# Patient Record
Sex: FEMALE | Race: WHITE
[De-identification: names, ages, dates, MRNs, and addresses within clinical notes are randomized per-mention and may not be internally consistent; named-entity substitution may affect disease eponyms.]

---

## 2020-05-20 ENCOUNTER — HOSPITAL ENCOUNTER (EMERGENCY)
Dept: HOSPITAL 75 - ER | Age: 55
Discharge: HOME | End: 2020-05-20
Payer: COMMERCIAL

## 2020-05-20 VITALS — SYSTOLIC BLOOD PRESSURE: 103 MMHG | DIASTOLIC BLOOD PRESSURE: 74 MMHG

## 2020-05-20 VITALS — DIASTOLIC BLOOD PRESSURE: 62 MMHG | SYSTOLIC BLOOD PRESSURE: 123 MMHG

## 2020-05-20 VITALS — BODY MASS INDEX: 18.64 KG/M2 | HEIGHT: 66.02 IN | WEIGHT: 115.96 LBS

## 2020-05-20 VITALS — SYSTOLIC BLOOD PRESSURE: 105 MMHG | DIASTOLIC BLOOD PRESSURE: 64 MMHG

## 2020-05-20 DIAGNOSIS — Z88.2: ICD-10-CM

## 2020-05-20 DIAGNOSIS — R79.1: ICD-10-CM

## 2020-05-20 DIAGNOSIS — D61.818: Primary | ICD-10-CM

## 2020-05-20 LAB
ALBUMIN SERPL-MCNC: 3.2 GM/DL (ref 3.2–4.5)
ALP SERPL-CCNC: 137 U/L (ref 40–136)
ALT SERPL-CCNC: 20 U/L (ref 0–55)
BASOPHILS # BLD AUTO: 0 10^3/UL (ref 0–0.1)
BASOPHILS NFR BLD AUTO: 1 % (ref 0–10)
BILIRUB SERPL-MCNC: 1 MG/DL (ref 0.1–1)
BUN/CREAT SERPL: 6
CALCIUM SERPL-MCNC: 8 MG/DL (ref 8.5–10.1)
CHLORIDE SERPL-SCNC: 104 MMOL/L (ref 98–107)
CO2 SERPL-SCNC: 22 MMOL/L (ref 21–32)
CREAT SERPL-MCNC: 0.84 MG/DL (ref 0.6–1.3)
EOSINOPHIL # BLD AUTO: 0 10^3/UL (ref 0–0.3)
EOSINOPHIL NFR BLD AUTO: 1 % (ref 0–10)
ERYTHROCYTE [DISTWIDTH] IN BLOOD BY AUTOMATED COUNT: 19.6 % (ref 10–14.5)
GFR SERPLBLD BASED ON 1.73 SQ M-ARVRAT: > 60 ML/MIN
GLUCOSE SERPL-MCNC: 115 MG/DL (ref 70–105)
HCT VFR BLD CALC: 23 % (ref 35–52)
HGB BLD-MCNC: 6.3 G/DL (ref 11.5–16)
INR PPP: 1.5 (ref 0.8–1.4)
LYMPHOCYTES # BLD AUTO: 0.8 X 10^3 (ref 1–4)
LYMPHOCYTES NFR BLD AUTO: 25 % (ref 12–44)
MAGNESIUM SERPL-MCNC: 1.8 MG/DL (ref 1.6–2.4)
MANUAL DIFFERENTIAL PERFORMED BLD QL: NO
MCH RBC QN AUTO: 23 PG (ref 25–34)
MCHC RBC AUTO-ENTMCNC: 28 G/DL (ref 32–36)
MCV RBC AUTO: 81 FL (ref 80–99)
MONOCYTES # BLD AUTO: 0.4 X 10^3 (ref 0–1)
MONOCYTES NFR BLD AUTO: 13 % (ref 0–12)
NEUTROPHILS # BLD AUTO: 2 X 10^3 (ref 1.8–7.8)
NEUTROPHILS NFR BLD AUTO: 61 % (ref 42–75)
PLATELET # BLD: 109 10^3/UL (ref 130–400)
PMV BLD AUTO: 11.2 FL (ref 7.4–10.4)
POTASSIUM SERPL-SCNC: 3.5 MMOL/L (ref 3.6–5)
PROT SERPL-MCNC: 7 GM/DL (ref 6.4–8.2)
PROTHROMBIN TIME: 18.3 SEC (ref 12.2–14.7)
RETICS #: 138 10E9/L (ref 24–90)
RETICS/RBC NFR: 4.91 % (ref 0.5–2.4)
SODIUM SERPL-SCNC: 136 MMOL/L (ref 135–145)
WBC # BLD AUTO: 3.3 10^3/UL (ref 4.3–11)

## 2020-05-20 PROCEDURE — 85045 AUTOMATED RETICULOCYTE COUNT: CPT

## 2020-05-20 PROCEDURE — 86850 RBC ANTIBODY SCREEN: CPT

## 2020-05-20 PROCEDURE — 36415 COLL VENOUS BLD VENIPUNCTURE: CPT

## 2020-05-20 PROCEDURE — 80053 COMPREHEN METABOLIC PANEL: CPT

## 2020-05-20 PROCEDURE — 86901 BLOOD TYPING SEROLOGIC RH(D): CPT

## 2020-05-20 PROCEDURE — 86900 BLOOD TYPING SEROLOGIC ABO: CPT

## 2020-05-20 PROCEDURE — 86920 COMPATIBILITY TEST SPIN: CPT

## 2020-05-20 PROCEDURE — 36430 TRANSFUSION BLD/BLD COMPNT: CPT

## 2020-05-20 PROCEDURE — 85610 PROTHROMBIN TIME: CPT

## 2020-05-20 PROCEDURE — 82728 ASSAY OF FERRITIN: CPT

## 2020-05-20 PROCEDURE — 83540 ASSAY OF IRON: CPT

## 2020-05-20 PROCEDURE — 85025 COMPLETE CBC W/AUTO DIFF WBC: CPT

## 2020-05-20 PROCEDURE — 83735 ASSAY OF MAGNESIUM: CPT

## 2020-05-20 NOTE — ED GENERAL
General


Chief Complaint:  Respiratory Problems


Stated Complaint:  SOA


Source of Information:  Patient


Exam Limitations:  No Limitations





History of Present Illness


Date Seen by Provider:  May 20, 2020


Time Seen by Provider:  10:04


Initial Comments


This 55-year-old woman presents to the emergency room by private vehicle because

of severe anemia.  She was seen by Lyric Pollard at the [facility] clinic in [place].  

Labs were drawn yesterday and resulted today showing a hemoglobin of 5.7.  

Patient reports having fatigue and dyspnea with exertion over the past few 

months.  She recently moved here from [place] and was establishing care with [facility].





Allergies and Home Medications


Allergies


Coded Allergies:  


     Sulfa (Sulfonamide Antibiotics) (Verified  Allergy, Unknown, 5/20/20)





Patient Home Medication List


Home Medication List Reviewed:  Yes





Review of Systems


Review of Systems


Constitutional:  see HPI


EENTM:  no symptoms reported


Respiratory:  see HPI


Cardiovascular:  no symptoms reported


Gastrointestinal:  no symptoms reported


Genitourinary:  no symptoms reported


Musculoskeletal:  no symptoms reported


Skin:  no symptoms reported


Psychiatric/Neurological:  No Symptoms Reported


Hematologic/Lymphatic:  See HPI





Past Medical-Social-Family Hx


Past Med/Social Hx:  Reviewed Nursing Past Med/Soc Hx


Patient Social History


Recent Foreign Travel:  No


Contact w/Someone Who Travel:  No





Past Medical History


Surgeries:  Yes


Appendectomy, Oophorectomy


Respiratory:  Yes


Cardiac:  Yes


Neurological:  Yes


Pregnant:  No


Reproductive Disorders:  No


Genitourinary:  No


Gastrointestinal:  Yes


Liver Disease/Jaundice (vague history of liver problems)


Musculoskeletal:  No


Endocrine:  No


HEENT:  No


Cancer:  No


Psychosocial:  No


Integumentary:  No





Physical Exam


Vital Signs





Vital Signs - First Documented








 5/20/20





 10:02


 


Temp 37.5


 


Pulse 114


 


Resp 17


 


B/P (MAP) 115/72 (86)


 


Pulse Ox 98


 


O2 Delivery Room Air





Capillary Refill :


Height, Weight, BMI


Height: '"


Weight: lbs. oz. kg;  BMI


Method:


General Appearance:  No Apparent Distress, WD/WN, Thin


HEENT:  PERRL/EOMI, Normal ENT Inspection, Pharynx Normal


Neck:  Normal Inspection


Respiratory:  Lungs Clear, Normal Breath Sounds, No Accessory Muscle Use, No 

Respiratory Distress


Cardiovascular:  No Edema, No Murmur, Normal Peripheral Pulses, Tachycardia


Gastrointestinal:  Non Tender, Soft


Extremity:  Normal Inspection, No Pedal Edema


Neurologic/Psychiatric:  Alert, Oriented x3, No Motor/Sensory Deficits, Normal 

Mood/Affect, CNs II-XII Norm as Tested


Skin:  Normal Color, Warm/Dry





Progress/Results/Core Measures


Suspected Sepsis


SIRS


Temperature: 


Pulse:  


Respiratory Rate: 


 


Laboratory Tests


5/20/20 10:15: White Blood Count 3.3L


Blood Pressure  / 


Mean: 


 


Laboratory Tests


5/20/20 10:15: 


Creatinine 0.84, INR Comment 1.5H, Platelet Count 109L, Total Bilirubin 1.0








Results/Orders


Lab Results





Laboratory Tests








Test


 5/20/20


10:15 5/20/20


16:00 Range/Units


 


 


White Blood Count


 3.3 L


 


 4.3-11.0


10^3/uL


 


Red Blood Count


 2.79 L


 


 4.35-5.85


10^6/uL


 


Hemoglobin 6.3 *L  11.5-16.0  G/DL


 


Hematocrit 23 L  35-52  %


 


Mean Corpuscular Volume 81   80-99  FL


 


Mean Corpuscular Hemoglobin 23 L  25-34  PG


 


Mean Corpuscular Hemoglobin


Concent 28 L


 


 32-36  G/DL





 


Red Cell Distribution Width 19.6 H  10.0-14.5  %


 


Platelet Count


 109 L


 


 130-400


10^3/uL


 


Mean Platelet Volume 11.2 H  7.4-10.4  FL


 


Neutrophils (%) (Auto) 61   42-75  %


 


Lymphocytes (%) (Auto) 25   12-44  %


 


Monocytes (%) (Auto) 13 H  0-12  %


 


Eosinophils (%) (Auto) 1   0-10  %


 


Basophils (%) (Auto) 1   0-10  %


 


Neutrophils # (Auto) 2.0   1.8-7.8  X 10^3


 


Lymphocytes # (Auto) 0.8 L  1.0-4.0  X 10^3


 


Monocytes # (Auto) 0.4   0.0-1.0  X 10^3


 


Eosinophils # (Auto)


 0.0 


 


 0.0-0.3


10^3/uL


 


Basophils # (Auto)


 0.0 


 


 0.0-0.1


10^3/uL


 


Absolute Reticulocyte Count 138 H  24-90  10e9/L


 


Percent Reticulocyte Count 4.91 H  0.50-2.40  %


 


Prothrombin Time 18.3 H  12.2-14.7  SEC


 


INR Comment 1.5 H  0.8-1.4  


 


Sodium Level 136   135-145  MMOL/L


 


Potassium Level 3.5 L  3.6-5.0  MMOL/L


 


Chloride Level 104     MMOL/L


 


Carbon Dioxide Level 22   21-32  MMOL/L


 


Anion Gap 10   5-14  MMOL/L


 


Blood Urea Nitrogen 5 L  7-18  MG/DL


 


Creatinine


 0.84 


 


 0.60-1.30


MG/DL


 


Estimat Glomerular Filtration


Rate > 60 


 


  





 


BUN/Creatinine Ratio 6    


 


Glucose Level 115 H    MG/DL


 


Calcium Level 8.0 L  8.5-10.1  MG/DL


 


Corrected Calcium 8.6   8.5-10.1  MG/DL


 


Magnesium Level 1.8   1.6-2.4  MG/DL


 


Iron Level 16 L    ug/dL


 


Total Iron Binding Capacity 453 H  280-380  ug/dL


 


Unsaturated Iron Binding


Capacity 437 


 


   ug/dL





 


Transferrin % Saturation 4 L  15-50  %


 


Total Bilirubin 1.0   0.1-1.0  MG/DL


 


Aspartate Amino Transf


(AST/SGOT) 45 H


 


 5-34  U/L





 


Alanine Aminotransferase


(ALT/SGPT) 20 


 


 0-55  U/L





 


Alkaline Phosphatase 137 H    U/L


 


Total Protein 7.0   6.4-8.2  GM/DL


 


Albumin 3.2   3.2-4.5  GM/DL


 


Lab Scanned Report


 


 Transfusion


Reaction Form  [id number]











My Orders





Orders - MARLI MATIAS MD


Cbc With Automated Diff (5/20/20 10:04)


Comprehensive Metabolic Panel (5/20/20 10:04)


Magnesium (5/20/20 10:04)


Protime With Inr (5/20/20 10:04)


Red Cells Leukocytes Reduced (5/20/20 10:04)


Type And Screen (5/20/20 10:04)


Iron Tibc %Sat & Ferritin (5/20/20 10:48)


Reticulocyte Count (5/20/20 10:15)


Ns Iv 500 Ml (Sodium Chloride 0.9%) (5/20/20 10:56)





Medications Given in ED





Current Medications








 Medications  Dose


 Ordered  Sig/Abisai


 Route  Start Time


 Stop Time Status Last Admin


Dose Admin


 


 Sodium Chloride  500 ml @   [facility] ONCE


 .ROUTE  5/20/20 10:56


 5/20/20 11:05 DC 5/20/20 11:37


500 MLS/HR








Vital Signs/I&O











 5/20/20 5/20/20 5/20/20 5/20/20





 10:02 11:20 11:35 12:01


 


Temp 37.5 36.2 36.9 36.2





    36.9





    36.4


 


Pulse 114 98 96 96


 


Resp 17 16 12 


 


B/P (MAP) 115/72 (86) 123/62 103/74 


 


Pulse Ox 98 100 100 


 


O2 Delivery Room Air Room Air Room Air 


 


    





    





    





 5/20/20   





 12:17   


 


Temp 36.4   


 


Pulse 85   


 


Resp 17   


 


B/P (MAP) 105/64 (84)   


 


Pulse Ox 100   


 


O2 Delivery Room Air   





Capillary Refill :


Progress Note :  


Progress Note


Patient's hemoglobin was below 7.  A unit of blood was transfused.  Iron studies

were obtained.  I contacted Dr. Perez who agrees scoping should be done very 

soon.  He suggested she also take omeprazole 20 mg daily.





Departure


Impression





   Primary Impression:  


   Pancytopenia


   Additional Impressions:  


   Severe anemia


   Elevated INR


Disposition:  01 HOME, SELF-CARE


Condition:  Stable





Departure-Patient Inst.


Decision time for Depature:  11:13


Referrals:  


[facility]/MAGNUS MATHEW MD


Patient Instructions:  Blood Transfusion





Add. Discharge Instructions:  


Follow-up with your primary care provider at [facility] as soon as possible.


Please also schedule follow-up with Dr. Perez to arrange for endoscopy.  This is 

important to determine where he might be losing blood.


You may continue iron supplementation and/or eating a high iron diet.


Take omeprazole 20 mg daily until you have endoscopy.


Return to the emergency room if you have worsening symptoms.





All discharge instructions reviewed with patient and/or family. Voiced 

understanding.





Copy


Copies To 1:   ANNE LOPEZ DO


Copies To 2:   MAGNUS PEREZ MD, JOSHUA T MD        May 20, 2020 11:15

## 2020-06-05 ENCOUNTER — HOSPITAL ENCOUNTER (EMERGENCY)
Dept: HOSPITAL 75 - ER | Age: 55
Discharge: HOME | End: 2020-06-05
Payer: COMMERCIAL

## 2020-06-05 ENCOUNTER — HOSPITAL ENCOUNTER (OUTPATIENT)
Dept: HOSPITAL 75 - LAB | Age: 55
Discharge: HOME | End: 2020-06-05
Attending: FAMILY MEDICINE
Payer: COMMERCIAL

## 2020-06-05 VITALS — SYSTOLIC BLOOD PRESSURE: 106 MMHG | DIASTOLIC BLOOD PRESSURE: 68 MMHG

## 2020-06-05 VITALS — SYSTOLIC BLOOD PRESSURE: 126 MMHG | DIASTOLIC BLOOD PRESSURE: 71 MMHG

## 2020-06-05 VITALS — DIASTOLIC BLOOD PRESSURE: 43 MMHG | SYSTOLIC BLOOD PRESSURE: 111 MMHG

## 2020-06-05 VITALS — SYSTOLIC BLOOD PRESSURE: 106 MMHG | DIASTOLIC BLOOD PRESSURE: 69 MMHG

## 2020-06-05 VITALS — WEIGHT: 110.23 LBS | HEIGHT: 65.75 IN | BODY MASS INDEX: 17.93 KG/M2

## 2020-06-05 VITALS — DIASTOLIC BLOOD PRESSURE: 67 MMHG | SYSTOLIC BLOOD PRESSURE: 96 MMHG

## 2020-06-05 DIAGNOSIS — K74.60: ICD-10-CM

## 2020-06-05 DIAGNOSIS — Z90.49: ICD-10-CM

## 2020-06-05 DIAGNOSIS — Z88.2: ICD-10-CM

## 2020-06-05 DIAGNOSIS — D64.9: Primary | ICD-10-CM

## 2020-06-05 DIAGNOSIS — Z01.89: Primary | ICD-10-CM

## 2020-06-05 LAB
ALBUMIN SERPL-MCNC: 3.3 GM/DL (ref 3.2–4.5)
ALP SERPL-CCNC: 77 U/L (ref 40–136)
ALT SERPL-CCNC: 19 U/L (ref 0–55)
BASOPHILS # BLD AUTO: 0 10^3/UL (ref 0–0.1)
BASOPHILS NFR BLD AUTO: 1 % (ref 0–10)
BILIRUB SERPL-MCNC: 0.9 MG/DL (ref 0.1–1)
BUN/CREAT SERPL: 11
CALCIUM SERPL-MCNC: 8.6 MG/DL (ref 8.5–10.1)
CHLORIDE SERPL-SCNC: 100 MMOL/L (ref 98–107)
CO2 SERPL-SCNC: 22 MMOL/L (ref 21–32)
CREAT SERPL-MCNC: 0.85 MG/DL (ref 0.6–1.3)
EOSINOPHIL # BLD AUTO: 0 10^3/UL (ref 0–0.3)
EOSINOPHIL NFR BLD AUTO: 1 % (ref 0–10)
ERYTHROCYTE [DISTWIDTH] IN BLOOD BY AUTOMATED COUNT: 18.3 % (ref 10–14.5)
GFR SERPLBLD BASED ON 1.73 SQ M-ARVRAT: > 60 ML/MIN
GLUCOSE SERPL-MCNC: 122 MG/DL (ref 70–105)
HCT VFR BLD CALC: 24 % (ref 35–52)
HCT VFR BLD CALC: 31 % (ref 35–52)
HGB BLD-MCNC: 6.9 G/DL (ref 11.5–16)
HGB BLD-MCNC: 9.2 G/DL (ref 11.5–16)
LYMPHOCYTES # BLD AUTO: 0.4 X 10^3 (ref 1–4)
LYMPHOCYTES NFR BLD AUTO: 11 % (ref 12–44)
MANUAL DIFFERENTIAL PERFORMED BLD QL: NO
MCH RBC QN AUTO: 24 PG (ref 25–34)
MCHC RBC AUTO-ENTMCNC: 29 G/DL (ref 32–36)
MCV RBC AUTO: 82 FL (ref 80–99)
MONOCYTES # BLD AUTO: 0.5 X 10^3 (ref 0–1)
MONOCYTES NFR BLD AUTO: 13 % (ref 0–12)
NEUTROPHILS # BLD AUTO: 2.8 X 10^3 (ref 1.8–7.8)
NEUTROPHILS NFR BLD AUTO: 75 % (ref 42–75)
PLATELET # BLD: 113 10^3/UL (ref 130–400)
PMV BLD AUTO: 11.2 FL (ref 7.4–10.4)
POTASSIUM SERPL-SCNC: 3.9 MMOL/L (ref 3.6–5)
PROT SERPL-MCNC: 6.6 GM/DL (ref 6.4–8.2)
SODIUM SERPL-SCNC: 133 MMOL/L (ref 135–145)
WBC # BLD AUTO: 3.7 10^3/UL (ref 4.3–11)

## 2020-06-05 PROCEDURE — 36415 COLL VENOUS BLD VENIPUNCTURE: CPT

## 2020-06-05 PROCEDURE — 86850 RBC ANTIBODY SCREEN: CPT

## 2020-06-05 PROCEDURE — 85025 COMPLETE CBC W/AUTO DIFF WBC: CPT

## 2020-06-05 PROCEDURE — 36430 TRANSFUSION BLD/BLD COMPNT: CPT

## 2020-06-05 PROCEDURE — 85014 HEMATOCRIT: CPT

## 2020-06-05 PROCEDURE — 86900 BLOOD TYPING SEROLOGIC ABO: CPT

## 2020-06-05 PROCEDURE — 86901 BLOOD TYPING SEROLOGIC RH(D): CPT

## 2020-06-05 PROCEDURE — 80053 COMPREHEN METABOLIC PANEL: CPT

## 2020-06-05 PROCEDURE — 82728 ASSAY OF FERRITIN: CPT

## 2020-06-05 PROCEDURE — 93005 ELECTROCARDIOGRAM TRACING: CPT

## 2020-06-05 PROCEDURE — 84484 ASSAY OF TROPONIN QUANT: CPT

## 2020-06-05 PROCEDURE — 83540 ASSAY OF IRON: CPT

## 2020-06-05 PROCEDURE — 85018 HEMOGLOBIN: CPT

## 2020-06-05 PROCEDURE — 86920 COMPATIBILITY TEST SPIN: CPT

## 2020-06-05 NOTE — XMS REPORT
Continuity of Care Document

                             Created on: 2020



Virginia Chang

External Reference #: [id number]

: 1965

Sex: Female



Demographics





                          Address                   [address] [place]  [address]

 

                          Home Phone                [phone] x

 

                          Preferred Language        Unknown

 

                          Marital Status            Unknown

 

                          Amish Affiliation     Unknown

 

                          Race                      Unknown

 

                          Ethnic Group              Unknown





Author





                          Organization              Unknown

 

                          Address                   Unknown

 

                          Phone                     Unavailable



              



Allergies

      



             Active           Description           Code           Type         

  Severity   

                Reaction           Onset           Reported/Identified          

 

Relationship to Patient                 Clinical Status        

 

                Yes             Sulfa (Sulfonamide Antibiotics)           [id number]

0491           

Drug Allergy           Unknown           N/A                             

020   

                                                             



                  



Medications

      



There is no data.                  



Problems

      



             Date Dx Coded           Attending           Type           Code    

       

Diagnosis                               Diagnosed By        

 

                2020           FLORENCIO ORTIZ, MARLI BRUNNER Ot      

        D61.818    

                          OTHER PANCYTOPENIA                    

 

                2020           FLORENCIO ORTIZ, MARLI BRUNNER Ot      

        R06.02     

                          SHORTNESS OF BREATH                    

 

                2020           FLORENCIO ORTIZ, MARLI BRUNNER Ot      

        R79.1      

                          ABNORMAL COAGULATION PROFILE                    

 

                2020           FLORENCOI ORTIZ, MARLI BRUNNER Ot      

        Z88.2      

                          ALLERGY STATUS TO SULFONAMIDES STATUS                 

   



                        



Procedures

      



There is no data.                  



Results

      



                    Test                Result              Range        

 

                                        TSH w/ FREE T4 - 20 10:39         

 

                    TSH                 1.89 mIU/L           NRG        

 

                    T4, FREE            1.1 ng/dL           0.8-1.8        

 

                                        LIPID PANEL - 20 10:39         

 

                    CHOLESTEROL, TOTAL           101 mg/dL           <200       

 

 

                    HDL CHOLESTEROL           35 mg/dL            > OR = 50     

   

 

                    TRIGLYCERIDES           95 mg/dL            <150        

 

                    LDL-CHOLESTEROL           48 mg/dL (calc)           NRG     

   

 

                    CHOL/HDLC RATIO           2.9 (calc)           <5.0        

 

                    NON HDL CHOLESTEROL           66 mg/dL (calc)           <130

        

 

                                        CMP - 20 10:39         

 

                    GLUCOSE             93 mg/dL            65-99        

 

                    UREA NITROGEN (BUN)           5 mg/dL             7-25      

  

 

                    CREATININE           0.82 mg/dL           0.50-1.05        

 

                    eGFR NON-AFR. AMERICAN           81 mL/min/1.73m2           

> OR = 60        

 

                    eGFR            94 mL/min/1.73m2           >

 OR = 60        

 

                    BUN/CREATININE RATIO           6 (calc)            6-22     

   

 

                    SODIUM              137 mmol/L           135-146        

 

                    POTASSIUM           3.7 mmol/L           3.5-5.3        

 

                    CHLORIDE            103 mmol/L                   

 

                    CARBON DIOXIDE           25 mmol/L           20-32        

 

                    CALCIUM             8.2 mg/dL           8.6-10.4        

 

                    PROTEIN, TOTAL           6.4 g/dL            6.1-8.1        

 

                    ALBUMIN             3.1 g/dL            3.6-5.1        

 

                    GLOBULIN            3.3 g/dL (calc)           1.9-3.7       

 

 

                    ALBUMIN/GLOBULIN RATIO           0.9 (calc)           1.0-2.

5        

 

                    BILIRUBIN, TOTAL           1.0 mg/dL           0.2-1.2      

  

 

                    ALKALINE PHOSPHATASE           140 U/L                

     

 

                    AST                 35 U/L              10-35        

 

                    ALT                 17 U/L              6-29        

 

                                        CBC - 20 10:39         

 

                    WHITE BLOOD CELL COUNT           2.6 Thousand/uL           3

.8-10.8        

 

                    RED BLOOD CELL COUNT           2.56 Million/uL           3.8

0-5.10        

 

                    HEMOGLOBIN           5.7 g/dL            11.7-15.5        

 

                    HEMATOCRIT           20.6 %              35.0-45.0        

 

                    MCV                 80.5 fL             80.0-100.0        

 

                    MCH                 22.3 pg             27.0-33.0        

 

                    MCHC                27.7 g/dL           32.0-36.0        

 

                    RDW                 17.4 %              11.0-15.0        

 

                    PLATELET COUNT           70 Thousand/uL           140-400   

     

 

                    MPV                 12.3 fL             7.5-12.5        

 

                    ABSOLUTE NEUTROPHILS           1570 cells/uL           1500-

7800        

 

                    ABSOLUTE LYMPHOCYTES           629 cells/uL           850-39

00        

 

                    ABSOLUTE MONOCYTES           351 cells/uL           200-950 

       

 

                    ABSOLUTE EOSINOPHILS           39 cells/uL            

       

 

                    ABSOLUTE BASOPHILS           10 cells/uL           0-200    

    

 

                    NEUTROPHILS           60.4 %              NRG        

 

                    LYMPHOCYTES           24.2 %              NRG        

 

                    MONOCYTES           13.5 %              NRG        

 

                    EOSINOPHILS           1.5 %               NRG        

 

                    BASOPHILS           0.4 %               NRG        

 

                                        PLATELET ESTIMATION - 20 10:39    

     

 

                    PLATELET ESTIMATION           DECREASED            ADEQUATE 

       

 

                                        CBC MORPHOLOGY - 20 10:39         

 

                    CBC MORPHOLOGY                               NORMAL        

 

                                        Complete blood count (CBC) with automate

d white blood cell (WBC) differential - 

20 10:15         

 

                          Blood leukocytes automated count (number/volume)      

     3.3 10*3/uL          

                                        4.3-11.0        

 

                          Blood erythrocytes automated count (number/volume)    

       2.79 10*6/uL       

                                        4.35-5.85        

 

                    Venous blood hemoglobin measurement (mass/volume)           

6.3 g/dL            

11.5-16.0        

 

                    Blood hematocrit (volume fraction)           23 %           

     35-52        

 

                    Automated erythrocyte mean corpuscular volume           81 [

foz_us]           

80-99        

 

                                        Automated erythrocyte mean corpuscular h

emoglobin (mass per erythrocyte)        

                          23 pg                     25-34        

 

                                        Automated erythrocyte mean corpuscular h

emoglobin concentration measurement 

(mass/volume)             28 g/dL                   32-36        

 

                    Automated erythrocyte distribution width ratio           19.

6 %              10.0-

14.5        

 

                    Automated blood platelet count (count/volume)           109 

10*3/uL           

130-400        

 

                          Automated blood platelet mean volume measurement      

     11.2 [foz_us]        

                                        7.4-10.4        

 

                    Automated blood neutrophils/100 leukocytes           61 %   

             42-75       

 

 

                    Automated blood lymphocytes/100 leukocytes           25 %   

             12-44       

 

 

                    Blood monocytes/100 leukocytes           13 %               

 0-12        

 

                    Automated blood eosinophils/100 leukocytes           1 %    

             0-10        

 

                    Automated blood basophils/100 leukocytes           1 %      

           0-10        

 

                    Blood neutrophils automated count (number/volume)           

2.0 10*3            

1.8-7.8        

 

                    Blood lymphocytes automated count (number/volume)           

0.8 10*3            

1.0-4.0        

 

                    Blood monocytes automated count (number/volume)           0.

4 10*3            

0.0-1.0        

 

                    Automated eosinophil count           0.0 10*3/uL           0

.0-0.3        

 

                    Automated blood basophil count (count/volume)           0.0 

10*3/uL           

0.0-0.1        

 

                                        Comprehensive metabolic panel - 20

 10:15         

 

                          Serum or plasma sodium measurement (moles/volume)     

      136 mmol/L          

                                        135-145        

 

                          Serum or plasma potassium measurement (moles/volume)  

         3.5 mmol/L       

                                        3.6-5.0        

 

                          Serum or plasma chloride measurement (moles/volume)   

        104 mmol/L        

                                                

 

                    Carbon dioxide           22 mmol/L           21-32        

 

                          Serum or plasma anion gap determination (moles/volume)

           10 mmol/L      

                                        5-14        

 

                          Serum or plasma urea nitrogen measurement (mass/volume

)           5 mg/dL       

                                        7-18        

 

                          Serum or plasma creatinine measurement (mass/volume)  

         0.84 mg/dL       

                                        0.60-1.30        

 

                    Serum or plasma urea nitrogen/creatinine mass ratio         

  6                   NRG  

      

 

                                        Serum or plasma creatinine measurement w

ith calculation of estimated glomerular 

filtration rate           >                         NRG        

 

                    Serum or plasma glucose measurement (mass/volume)           

115 mg/dL           

        

 

                    Serum or plasma calcium measurement (mass/volume)           

8.0 mg/dL           

8.5-10.1        

 

                          Serum or plasma total bilirubin measurement (mass/volu

me)           1.0 mg/dL   

                                        0.1-1.0        

 

                                        Serum or plasma alkaline phosphatase raman

surement (enzymatic activity/volume)    

                          137 U/L                           

 

                                        Serum or plasma aspartate aminotransfera

se measurement (enzymatic 

activity/volume)           45 U/L                    5-34        

 

                                        Serum or plasma alanine aminotransferase

 measurement (enzymatic activity/volume)

                          20 U/L                    0-55        

 

                    Serum or plasma protein measurement (mass/volume)           

7.0 g/dL            

6.4-8.2        

 

                    Serum or plasma albumin measurement (mass/volume)           

3.2 g/dL            

3.2-4.5        

 

                    CALCIUM CORRECTED           8.6 mg/dL           8.5-10.1    

    

 

                                        PT panel in platelet poor plasma by coag

ulation assay - 20 10:15         

 

                          Prothrombin time (PT) in platelet poor plasma by coagu

lation assay           

18.3 s                                  12.2-14.7        

 

                          INR in platelet poor plasma or blood by coagulation as

say           1.5         

                                        0.8-1.4        

 

                                        RED CELLS LEUKO REDUCED AS1 - 20 1

0:15         

 

                          RED CELLS LEUKO REDUCED AS1                          T

RANSFUSED     20 

1053                                    NRG        

 

                                        Blood type T Indirect antibody screen pa

fili - 20 10:15         

 

                    WRISTBAND NUMBER           [id number]             NRG        

 

                    ABO+Rh group           AP                  NRG        

 

                    Blood group antibody screen           NEGATIVE            NR

G        

 

                                        Magnesium - 20 10:15         

 

                    Magnesium           1.8 mg/dL           1.6-2.4        

 

                                        Automated reticulocyte percentage -  10:15         

 

                    Blood reticulocytes count (number/volume)           138 10*9

/L           24-90  

      

 

                    Blood reticulocytes/100 erythrocytes           4.91 %       

       0.50-2.40       

 

 

                                        Serum iron and total iron binding capaci

ty panel - 20 10:15         

 

                    TIBC                453 %               280-380        

 

                    UIBC                437 %                       

 

                    Serum or plasma iron measurement (mass/volume)           16 

%                  

      

 

                          Total iron binding capacity and transferrin saturation

 measurement           4 %

                                        15-50        

 

                    Serum or plasma ferritin measurement (mass/volume)          

 7.0 %               

20.0-177.0        



                                          



Encounters

      



                ACCT No.           Visit Date/Time           Discharge          

 Status         

             Pt. Type           Provider           Facility           Loc./Unit 

          

Complaint        

 

             [id number]           2020 11:00:00                        [facility]    

       

Outpatient           GUILLERMO AGUILERA                               [facility]                                             

 

             [id number]           2020 10:20:00                              

       Document

 Registration                                                                   

 

 

                    [id number]           2020 11:17:00            13:10:00        

                DIS             Emergency           DOVER CARINA JOSHUA           

[facility]           ER                        LOW HEMOGLOBIN        

 

                    [id number]           2020 09:59:00            12:15:00        

                DIS             Outpatient           FLORENCIO ORTIZ, MARLI BRUNNER   

        [facility] 

[facility]           ER                        SOA        

 

             [id number]           2020 12:49:00                        A

CT           

Outpatient                CARINA DOVER DO           [facility]                 SDC                       TYPE   [facility] TRA

NSFUSE 1 UNIT 

PRBCS

## 2020-06-05 NOTE — ED GENERAL
General


Stated Complaint:  LOW HEMOGLOBIN





History of Present Illness


Date Seen by Provider:  Jun 5, 2020


Time Seen by Provider:  11:28


Initial Comments


55-year-old female brought in by EMS. Patient was at her primary care provider 

when she had a syncope event. No syncope event happen all they were drawing 

blood from her. Primary feels it was likely vasovagal. However EMS reports that 

lab at outpatient facility her hemoglobin was 5.9. Patient was seen here couple 

weeks ago and required a blood transfusion with a low hemoglobin. Patient denies

any prior low hemoglobin history. She does have concerns that she's having 

issues with an ulcer. 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 

very young. Patient states that she's had some bloody stool in the past but none

recently. She denies any dark tarry stool.





Allergies and Home Medications


Allergies


Coded Allergies:  


     Sulfa (Sulfonamide Antibiotics) (Verified  Allergy, Unknown, 5/20/20)





Patient Home Medication List


Home Medication List Reviewed:  Yes





Review of Systems


Review of Systems


Constitutional:  No chills, No fever; weakness


Respiratory:  No cough, No short of breath


Cardiovascular:  No chest pain, No palpitations


Gastrointestinal:  see HPI


Musculoskeletal:  no symptoms reported


Skin:  no symptoms reported


Psychiatric/Neurological:  See HPI





Past Medical-Social-Family Hx


Past Med/Social Hx:  Reviewed Nursing Past Med/Soc Hx


Patient Social History


2nd Hand Smoke Exposure:  No


Recent Hopitalizations:  No





Seasonal Allergies


Seasonal Allergies:  Yes





Past Medical History


Surgeries:  Yes


Appendectomy, Oophorectomy


Respiratory:  Yes


Pneumonia


Cardiac:  Yes


Neurological:  Yes


Reproductive Disorders:  No


Genitourinary:  No


Gastrointestinal:  Yes


Liver Disease/Jaundice


Musculoskeletal:  No


Endocrine:  No


HEENT:  No


Cancer:  No


Psychosocial:  No


Integumentary:  No


Blood Disorders:  No





Physical Exam


Vital Signs





Vital Signs - First Documented








 6/5/20





 11:25


 


Temp 36.2


 


Pulse 97


 


Resp 18


 


B/P (MAP) 117/74 (88)


 


Pulse Ox 100





Capillary Refill :


Height, Weight, BMI


Height: '"


Weight: lbs. oz. kg; 18.00 BMI


Method:


General Appearance:  No Apparent Distress, WD/WN


HEENT:  PERRL/EOMI


Respiratory:  Lungs Clear, Normal Breath Sounds


Cardiovascular:  Regular Rate, Rhythm, No Edema


Extremity:  Normal Capillary Refill, Normal Inspection


Neurologic/Psychiatric:  Alert, Oriented x3, No Motor/Sensory Deficits


Skin:  Normal Color, Warm/Dry


Lymphatic:  No Adenopathy





Progress/Results/Core Measures


Suspected Sepsis


SIRS


Temperature: 


Pulse:  


Respiratory Rate: 


 


Laboratory Tests


6/5/20 11:32: White Blood Count 3.7L


Blood Pressure  / 


Mean: 


 





Laboratory Tests


6/5/20 11:32: 


Creatinine 0.85, Platelet Count 113L, Total Bilirubin 0.9








Results/Orders


Lab Results





Laboratory Tests








Test


 6/5/20


11:32 Range/Units


 


 


White Blood Count


 3.7 L


 4.3-11.0


10^3/uL


 


Red Blood Count


 2.93 L


 4.35-5.85


10^6/uL


 


Hemoglobin 6.9 *L 11.5-16.0  G/DL


 


Hematocrit 24 L 35-52  %


 


Mean Corpuscular Volume 82  80-99  FL


 


Mean Corpuscular Hemoglobin 24 L 25-34  PG


 


Mean Corpuscular Hemoglobin


Concent 29 L


 32-36  G/DL





 


Red Cell Distribution Width 18.3 H 10.0-14.5  %


 


Platelet Count


 113 L


 130-400


10^3/uL


 


Mean Platelet Volume 11.2 H 7.4-10.4  FL


 


Neutrophils (%) (Auto) 75  42-75  %


 


Lymphocytes (%) (Auto) 11 L 12-44  %


 


Monocytes (%) (Auto) 13 H 0-12  %


 


Eosinophils (%) (Auto) 1  0-10  %


 


Basophils (%) (Auto) 1  0-10  %


 


Neutrophils # (Auto) 2.8  1.8-7.8  X 10^3


 


Lymphocytes # (Auto) 0.4 L 1.0-4.0  X 10^3


 


Monocytes # (Auto) 0.5  0.0-1.0  X 10^3


 


Eosinophils # (Auto)


 0.0 


 0.0-0.3


10^3/uL


 


Basophils # (Auto)


 0.0 


 0.0-0.1


10^3/uL


 


Sodium Level 133 L 135-145  MMOL/L


 


Potassium Level 3.9  3.6-5.0  MMOL/L


 


Chloride Level 100    MMOL/L


 


Carbon Dioxide Level 22  21-32  MMOL/L


 


Anion Gap 11  5-14  MMOL/L


 


Blood Urea Nitrogen 9  7-18  MG/DL


 


Creatinine


 0.85 


 0.60-1.30


MG/DL


 


Estimat Glomerular Filtration


Rate > 60 


  





 


BUN/Creatinine Ratio 11   


 


Glucose Level 122 H   MG/DL


 


Calcium Level 8.6  8.5-10.1  MG/DL


 


Corrected Calcium 9.2  8.5-10.1  MG/DL


 


Total Bilirubin 0.9  0.1-1.0  MG/DL


 


Aspartate Amino Transf


(AST/SGOT) 40 H


 5-34  U/L





 


Alanine Aminotransferase


(ALT/SGPT) 19 


 0-55  U/L





 


Alkaline Phosphatase 77    U/L


 


Troponin I < 0.028  <0.028  NG/ML


 


Total Protein 6.6  6.4-8.2  GM/DL


 


Albumin 3.3  3.2-4.5  GM/DL








My Orders





Orders - STANISLAWACRINA L DO


Vital Signs: Special (Order) (6/5/20 11:30)


Ns Iv 500 Ml (Sodium Chloride 0.9%) (6/5/20 11:30)


Type And Screen (6/5/20 11:30)


Cbc With Automated Diff (6/5/20 11:30)


Comprehensive Metabolic Panel (6/5/20 11:30)


Troponin I (6/5/20 11:30)


Pantoprazole Injection (Protonix Injecti (6/5/20 11:30)


Iron Tibc %Sat & Ferritin (6/5/20 11:30)


Ekg Tracing (6/5/20 12:12)





Medications Given in ED





Current Medications








 Medications  Dose


 Ordered  Sig/Abisai


 Route  Start Time


 Stop Time Status Last Admin


Dose Admin


 


 Pantoprazole  80 mg  ONCE  ONCE


 IV  6/5/20 11:30


 6/5/20 11:34 DC 6/5/20 11:47


80 MG








Vital Signs/I&O











 6/5/20





 11:25


 


Temp 36.2


 


Pulse 97


 


Resp 18


 


B/P (MAP) 117/74 (88)


 


Pulse Ox 100





Capillary Refill :


Progress Note :  


   Time:  12:45


Progress Note


Patient with hemoglobin of 6.9. I did discuss with Dr. Alvarez [facility]. We will 

discharge patient and transfuse 1 unit outpatient. Patient did not follow back 

up with [facility] for eventual outpatient workup and likely EGD and colonoscopy. 

Patient is stable and discharged home in stable condition.





Departure


Impression





   Primary Impression:  


   Anemia


   Qualified Codes:  D50.9 - Iron deficiency anemia, unspecified


   Additional Impression:  


   Cirrhosis of liver


   Qualified Codes:  K74.60 - Unspecified cirrhosis of liver


Disposition:  01 HOME, SELF-CARE


Condition:  Stable





Departure-Patient Inst.


Referrals:  


[facility] (PCP/Family)


Primary Care Physician


Patient Instructions:  Diet for Cirrhosis, Anemia Caused by Low Iron





Add. Discharge Instructions:  


Present to outpatient lab for transfusion at discharge





Follow-up with [facility] as soon as possible for further evaluation of 

your anemia











CARINA DOVER DO                Jun 5, 2020 11:28

## 2020-06-09 ENCOUNTER — HOSPITAL ENCOUNTER (OUTPATIENT)
Dept: HOSPITAL 75 - RAD | Age: 55
End: 2020-06-09
Attending: NURSE PRACTITIONER
Payer: COMMERCIAL

## 2020-06-09 DIAGNOSIS — K63.89: ICD-10-CM

## 2020-06-09 DIAGNOSIS — K76.6: ICD-10-CM

## 2020-06-09 DIAGNOSIS — K74.60: Primary | ICD-10-CM

## 2020-06-09 PROCEDURE — 74177 CT ABD & PELVIS W/CONTRAST: CPT

## 2020-06-09 NOTE — DIAGNOSTIC IMAGING REPORT
PROCEDURE: 

CT abdomen and pelvis with contrast.



TECHNIQUE: 

Multiple contiguous axial images were obtained through the

abdomen and pelvis after administration of intravenous contrast.

Auto Exposure Controls were utilized during the CT exam to meet

ALARA standards for radiation dose reduction. 



DATE: 

June 9, 2020.



COMPARISON: 

None. 



INDICATION: 

55-year-old female, cirrhosis. Blood in stool.



FINDINGS: 

The visualized portions of the lung bases are clear. The heart is

not enlarged. There is no pericardial effusion.



The outer liver contours are not grossly nodular. There is a very

prominent abnormally dilated and recanalized paraumbilical vein

with large collateral vessels in the anterior abdominal wall

subcutaneous tissues at the level of the umbilicus and also in

the right anterior abdomen. The spleen is not meeting threshold

criteria for diagnosis of splenomegaly at 13.5 cm in craniocaudal

dimension. There is no identified focal liver lesion.



There is cholelithiasis without evidence of acute cholecystitis.

There is no biliary ductal dilation. The main pancreatic duct is

not abnormally dilated. Unremarkable appearance of the pancreatic

parenchyma. The adrenal glands are unremarkable.



Unremarkable appearance of the renal parenchyma. The urinary

collecting systems are not distended. There is no identified

renal or ureteral stone. The urinary bladder is unremarkable.



There is mild wall thickening involving the length of the sigmoid

colon extending to the level of the rectum. The intestinal tract

is not distended. There is no free intraperitoneal air, drainable

fluid collection, or free pelvic fluid.



There are atherosclerotic calcifications. There is no identified

abnormally enlarged lymph node in the abdomen or pelvis which

meets CT size criteria for adenopathy.



There is no identified acute bony abnormality.



IMPRESSION: 

1. Cirrhosis with findings of portal hypertension as described

above.



2. Long segment abnormal wall thickening of the entire sigmoid

colon to the level of the rectum which may relate to a

nonspecific colitis. Infectious and inflammatory etiologies are

favored.



Dictated by: 



  Dictated on workstation # [id number]

## 2020-09-17 ENCOUNTER — HOSPITAL ENCOUNTER (OUTPATIENT)
Dept: HOSPITAL 75 - SDC | Age: 55
Discharge: HOME | End: 2020-09-17
Attending: INTERNAL MEDICINE
Payer: COMMERCIAL

## 2020-09-17 VITALS — WEIGHT: 108.25 LBS | BODY MASS INDEX: 17.4 KG/M2 | HEIGHT: 65.98 IN

## 2020-09-17 VITALS — SYSTOLIC BLOOD PRESSURE: 112 MMHG | DIASTOLIC BLOOD PRESSURE: 61 MMHG

## 2020-09-17 DIAGNOSIS — E61.1: ICD-10-CM

## 2020-09-17 DIAGNOSIS — K70.30: Primary | ICD-10-CM

## 2020-09-17 PROCEDURE — 96365 THER/PROPH/DIAG IV INF INIT: CPT

## 2020-10-29 ENCOUNTER — HOSPITAL ENCOUNTER (OUTPATIENT)
Dept: HOSPITAL 75 - PREOP | Age: 55
Discharge: HOME | End: 2020-10-29
Attending: SURGERY
Payer: COMMERCIAL

## 2020-10-29 VITALS — WEIGHT: 111.33 LBS | BODY MASS INDEX: 17.89 KG/M2 | HEIGHT: 66.02 IN

## 2020-10-29 DIAGNOSIS — Z20.828: ICD-10-CM

## 2020-10-29 DIAGNOSIS — K62.5: ICD-10-CM

## 2020-10-29 DIAGNOSIS — Z01.812: Primary | ICD-10-CM

## 2020-10-29 DIAGNOSIS — D64.9: ICD-10-CM

## 2020-10-29 PROCEDURE — 87635 SARS-COV-2 COVID-19 AMP PRB: CPT

## 2020-11-02 ENCOUNTER — HOSPITAL ENCOUNTER (OUTPATIENT)
Dept: HOSPITAL 75 - ENDO | Age: 55
Discharge: HOME | End: 2020-11-02
Attending: SURGERY
Payer: COMMERCIAL

## 2020-11-02 VITALS — DIASTOLIC BLOOD PRESSURE: 49 MMHG | SYSTOLIC BLOOD PRESSURE: 86 MMHG

## 2020-11-02 VITALS — DIASTOLIC BLOOD PRESSURE: 77 MMHG | SYSTOLIC BLOOD PRESSURE: 128 MMHG

## 2020-11-02 VITALS — DIASTOLIC BLOOD PRESSURE: 47 MMHG | SYSTOLIC BLOOD PRESSURE: 73 MMHG

## 2020-11-02 VITALS — DIASTOLIC BLOOD PRESSURE: 60 MMHG | SYSTOLIC BLOOD PRESSURE: 110 MMHG

## 2020-11-02 VITALS — WEIGHT: 111.33 LBS | BODY MASS INDEX: 17.89 KG/M2 | HEIGHT: 66.02 IN

## 2020-11-02 VITALS — SYSTOLIC BLOOD PRESSURE: 104 MMHG | DIASTOLIC BLOOD PRESSURE: 57 MMHG

## 2020-11-02 VITALS — SYSTOLIC BLOOD PRESSURE: 76 MMHG | DIASTOLIC BLOOD PRESSURE: 46 MMHG

## 2020-11-02 VITALS — SYSTOLIC BLOOD PRESSURE: 110 MMHG | DIASTOLIC BLOOD PRESSURE: 60 MMHG

## 2020-11-02 VITALS — DIASTOLIC BLOOD PRESSURE: 76 MMHG | SYSTOLIC BLOOD PRESSURE: 104 MMHG

## 2020-11-02 DIAGNOSIS — Z88.2: ICD-10-CM

## 2020-11-02 DIAGNOSIS — K21.00: ICD-10-CM

## 2020-11-02 DIAGNOSIS — D64.9: ICD-10-CM

## 2020-11-02 DIAGNOSIS — Z79.899: ICD-10-CM

## 2020-11-02 DIAGNOSIS — K64.8: ICD-10-CM

## 2020-11-02 DIAGNOSIS — K70.30: ICD-10-CM

## 2020-11-02 DIAGNOSIS — K29.50: Primary | ICD-10-CM

## 2020-11-02 PROCEDURE — 88312 SPECIAL STAINS GROUP 1: CPT

## 2020-11-02 PROCEDURE — 88305 TISSUE EXAM BY PATHOLOGIST: CPT

## 2020-11-02 NOTE — PROGRESS NOTE-POST OPERATIVE
Post-Operative Progess Note


Surgeon (s)/Assistant (s)


Surgeon


JOS JERRY DO


Assistant:  TOD Alicia





Pre-Operative Diagnosis


rectal bleed, anemia, GERD





Post-Operative Diagnosis





Gastritis


Friable tissue


Int hemorrhoids





Procedure & Operative Findings


Date of Procedure


11/2/20


Procedure Performed/Findings


EGD with bx


Colon


Anesthesia Type


IV sedation by CRNA





Estimated Blood Loss


Estimated blood loss (mL):  scant





Specimens/Packing


Specimens Removed


antral bx


body of stomach bx


GE jxn bx











JOS JERRY DO                Nov 2, 2020 12:25

## 2020-11-02 NOTE — ENDOSCOPY DISCHARGE INSTRUCT
Endo Procedure/Findings


Findings


1.:  Gastritis


2.:  Internal Hemorrhoids


3.:  Other Findings (Friable tissue)





Discharge Instructions


-


Activity: You might feel a little sleepy until tomorrow.  This is due to the 

medicine you received to relax you.





Until tomorrow, you should:  


   NOT drive a car, operate machinery or power tools.


   NOT drink any alcoholic beverages.


   NOT make any important decisions or sign importortant papers.





Do not return to work until tomorrow, unless otherwise instructed. Resume 

previous activities tomorrow.





Diet: Start by taking liquids.  If you tolerate liquids, advance to solid food.


1.:  Colonscopy in 10 years, EGD in 3 years





Notify Physician


-


If you experience excessive bleeding, unusual abdominal pain, fever, or chest 

pain, contact your doctor immediately.











JOS JERRY DO                Nov 2, 2020 12:27

## 2020-11-02 NOTE — PROGRESS NOTE-PRE OPERATIVE
Pre-Operative Progress Note


H&P Reviewed


The H&P was reviewed, patient examined and no changes noted.


Time Seen by Provider:  09:46


Date H&P Reviewed:  Nov 2, 2020


Time H&P Reviewed:  09:43


Pre-Operative Diagnosis:  rectal bleed, anemia, GERD











JOS JERRY DO                Nov 2, 2020 09:48

## 2020-11-02 NOTE — ANESTHESIA-GENERAL POST-OP
MAC


Patient Condition


Mental Status/LOC:  Same as Preop


Cardiovascular:  Satisfactory


Nausea/Vomiting:  Absent


Respiratory:  Satisfactory


Pain:  Controlled


Complications:  Absent





Post Op Complications


Complications


None





Follow Up Care/Instructions


Patient Instructions


None needed.





Anesthesiology Discharge Order


Discharge Order


Patient is doing well, no complaints, stable vital signs, no apparent adverse 

anesthesia problems.   


No complications reported per nursing.











CAMILLA MARQUES CRNA          Nov 2, 2020 13:07

## 2020-11-03 NOTE — OPERATIVE REPORT
DATE OF SERVICE:  11/02/2020



PREOPERATIVE DIAGNOSES:

Rectal bleed, anemia and gastroesophageal reflux disease.



POSTOPERATIVE DIAGNOSES:

1.  Gastritis.

2.  Friable mucosal tissue of the colon and some internal hemorrhoids.



PROCEDURE:

1.  EGD with biopsy.

2.  Colonoscopy.



SURGEON:

Arcadio Jerry DO



FIRST ASSISTANT:

Debra Huitron MS3.



ANESTHESIA:

IV sedation by the CRNA.



SPECIMEN:

Biopsy from antrum, biopsy from the GE junction, biopsy of body of stomach.



BLOOD LOSS:

Scant.



FLUIDS:

Per anesthesia.



POSTOPERATIVE CONDITION:

Stable.



INDICATION FOR PROCEDURE:

The patient is a 55-year-old female who has been having some rectal bleeding. 

She is anemic and she has some GERD and needed a workup.



FINDINGS:

The patient had some pretty severe gastritis and in the colon had very friable

tissue, but I did not see any colitis or any reasons for her rectal bleeding or

anemia.  She also was noted to have some internal hemorrhoids.



PROCEDURE NOTE:

After informed consent was obtained, the patient was brought to the endoscopy

suite, placed in bed in left lateral decubitus position.  She was administered

IV sedation by the CRNA who then monitored her vitals the entire time, heart

rate, blood pressure and pulse ox.  We started with the EGD, placed the scope

down the mouth through the esophagus into the stomach.  Upon entering the

stomach, noted some gastritis, pushed into the duodenum.  Duodenum looked fine. 

Pulled back and did a biopsy of the antrum.  Retroflexed the scope, did not

really have a hiatal hernia, did have some moderate to severe gastritis in the

body of stomach, did a biopsy of body of stomach and then pulled the scope into

the GE junction, did a biopsy of the GE junction.  The esophagus looked very

red, but looked like it cleared and we were able to flush it with saline.  At

this point, suctioned all the air out of stomach and then pulled the scope up

the esophagus and out the mouth.  Switched camera, switched gloves, went down

below, started the colonoscopy, pushed in to about 150 cm, able to get to the

cecum, took a picture of appendiceal orifice, noted the ileocecal valve and then

slowly withdrew the scope insufflating to look circumferentially at the walls

looking at the cecum, up the ascending colon to the hepatic flexure, then down

the transverse colon to splenic flexure, into the descending colon down to the

sigmoid and finally into the rectum.  Throughout the colon, there was very

easily friable tissue that bled with just the minimal scope trauma, did not see

any colitis, did not see any other masses or any other reasons for the rectal

bleeding.  Retroflexed the scope in the rectal vault, saw some minimal internal

hemorrhoids, took a picture and then removed the scope.  The patient tolerated

the procedure well.  She recovered in endoscopy suite.





Job ID: [id number]

DocumentID: [id number]

Dictated Date:  11/02/2020 17:46:42

Transcription Date: 11/03/2020 04:18:03

Dictated By: ARCADIO JERRY DO

## 2020-11-26 ENCOUNTER — HOSPITAL ENCOUNTER (INPATIENT)
Dept: HOSPITAL 75 - ER | Age: 55
LOS: 3 days | Discharge: HOME | DRG: 379 | End: 2020-11-29
Attending: INTERNAL MEDICINE | Admitting: INTERNAL MEDICINE
Payer: COMMERCIAL

## 2020-11-26 VITALS — SYSTOLIC BLOOD PRESSURE: 114 MMHG | DIASTOLIC BLOOD PRESSURE: 72 MMHG

## 2020-11-26 VITALS — HEIGHT: 55.91 IN | BODY MASS INDEX: 24.3 KG/M2 | WEIGHT: 108.03 LBS

## 2020-11-26 VITALS — DIASTOLIC BLOOD PRESSURE: 60 MMHG | SYSTOLIC BLOOD PRESSURE: 107 MMHG

## 2020-11-26 DIAGNOSIS — Z90.722: ICD-10-CM

## 2020-11-26 DIAGNOSIS — R82.90: ICD-10-CM

## 2020-11-26 DIAGNOSIS — Z87.01: ICD-10-CM

## 2020-11-26 DIAGNOSIS — Z90.89: ICD-10-CM

## 2020-11-26 DIAGNOSIS — I10: ICD-10-CM

## 2020-11-26 DIAGNOSIS — K42.9: ICD-10-CM

## 2020-11-26 DIAGNOSIS — Z20.828: ICD-10-CM

## 2020-11-26 DIAGNOSIS — K74.60: ICD-10-CM

## 2020-11-26 DIAGNOSIS — K21.9: ICD-10-CM

## 2020-11-26 DIAGNOSIS — D50.0: ICD-10-CM

## 2020-11-26 DIAGNOSIS — K80.20: ICD-10-CM

## 2020-11-26 DIAGNOSIS — K92.1: Primary | ICD-10-CM

## 2020-11-26 DIAGNOSIS — E87.6: ICD-10-CM

## 2020-11-26 LAB
ALBUMIN SERPL-MCNC: 3 GM/DL (ref 3.2–4.5)
ALP SERPL-CCNC: 80 U/L (ref 40–136)
ALT SERPL-CCNC: 30 U/L (ref 0–55)
APTT PPP: YELLOW S
BACTERIA #/AREA URNS HPF: (no result) /HPF
BARBITURATES UR QL: NEGATIVE
BASOPHILS # BLD AUTO: 0 10^3/UL (ref 0–0.1)
BASOPHILS NFR BLD AUTO: 0 % (ref 0–10)
BENZODIAZ UR QL SCN: NEGATIVE
BILIRUB SERPL-MCNC: 1.2 MG/DL (ref 0.1–1)
BILIRUB UR QL STRIP: NEGATIVE
BUN/CREAT SERPL: 62
CALCIUM SERPL-MCNC: 9.8 MG/DL (ref 8.5–10.1)
CHLORIDE SERPL-SCNC: 102 MMOL/L (ref 98–107)
CO2 SERPL-SCNC: 17 MMOL/L (ref 21–32)
COCAINE UR QL: NEGATIVE
CREAT SERPL-MCNC: 0.66 MG/DL (ref 0.6–1.3)
EOSINOPHIL # BLD AUTO: 0 10^3/UL (ref 0–0.3)
EOSINOPHIL NFR BLD AUTO: 0 % (ref 0–10)
EOSINOPHIL NFR BLD MANUAL: 1 %
FIBRINOGEN PPP-MCNC: CLEAR MG/DL
GFR SERPLBLD BASED ON 1.73 SQ M-ARVRAT: > 60 ML/MIN
GLUCOSE SERPL-MCNC: 164 MG/DL (ref 70–105)
GLUCOSE UR STRIP-MCNC: NEGATIVE MG/DL
HCT VFR BLD CALC: 30 % (ref 35–52)
HGB BLD-MCNC: 9.4 G/DL (ref 11.5–16)
KETONES UR QL STRIP: NEGATIVE
LEUKOCYTE ESTERASE UR QL STRIP: (no result)
LYMPHOCYTES # BLD AUTO: 1.4 10^3/UL (ref 1–4)
LYMPHOCYTES NFR BLD AUTO: 7 % (ref 12–44)
MANUAL DIFFERENTIAL PERFORMED BLD QL: YES
MCH RBC QN AUTO: 32 PG (ref 25–34)
MCHC RBC AUTO-ENTMCNC: 31 G/DL (ref 32–36)
MCV RBC AUTO: 102 FL (ref 80–99)
METHADONE UR QL SCN: NEGATIVE
METHAMPHETAMINE SCREEN URINE S: NEGATIVE
MONOCYTES # BLD AUTO: 1.5 10^3/UL (ref 0–1)
MONOCYTES NFR BLD AUTO: 8 % (ref 0–12)
MONOCYTES NFR BLD: 3 %
NEUTROPHILS # BLD AUTO: 16.5 10^3/UL (ref 1.8–7.8)
NEUTROPHILS NFR BLD AUTO: 84 % (ref 42–75)
NEUTS BAND NFR BLD MANUAL: 92 %
NITRITE UR QL STRIP: NEGATIVE
OPIATES UR QL SCN: NEGATIVE
OXYCODONE UR QL: NEGATIVE
PH UR STRIP: 7 [PH] (ref 5–9)
PLATELET # BLD: 111 10^3/UL (ref 130–400)
PMV BLD AUTO: 12.4 FL (ref 9–12.2)
POTASSIUM SERPL-SCNC: 4.2 MMOL/L (ref 3.6–5)
PROPOXYPH UR QL: NEGATIVE
PROT SERPL-MCNC: 6.1 GM/DL (ref 6.4–8.2)
PROT UR QL STRIP: NEGATIVE
RBC #/AREA URNS HPF: (no result) /HPF
RBC MORPH BLD: NORMAL
SODIUM SERPL-SCNC: 135 MMOL/L (ref 135–145)
SP GR UR STRIP: 1.01 (ref 1.02–1.02)
SQUAMOUS #/AREA URNS HPF: (no result) /HPF
TRICYCLICS UR QL SCN: NEGATIVE
VARIANT LYMPHS NFR BLD MANUAL: 4 %
WBC # BLD AUTO: 19.7 10^3/UL (ref 4.3–11)

## 2020-11-26 PROCEDURE — 85025 COMPLETE CBC W/AUTO DIFF WBC: CPT

## 2020-11-26 PROCEDURE — 87088 URINE BACTERIA CULTURE: CPT

## 2020-11-26 PROCEDURE — 84145 PROCALCITONIN (PCT): CPT

## 2020-11-26 PROCEDURE — 87635 SARS-COV-2 COVID-19 AMP PRB: CPT

## 2020-11-26 PROCEDURE — 74177 CT ABD & PELVIS W/CONTRAST: CPT

## 2020-11-26 PROCEDURE — 93005 ELECTROCARDIOGRAM TRACING: CPT

## 2020-11-26 PROCEDURE — 85007 BL SMEAR W/DIFF WBC COUNT: CPT

## 2020-11-26 PROCEDURE — 86850 RBC ANTIBODY SCREEN: CPT

## 2020-11-26 PROCEDURE — 86901 BLOOD TYPING SEROLOGIC RH(D): CPT

## 2020-11-26 PROCEDURE — 85027 COMPLETE CBC AUTOMATED: CPT

## 2020-11-26 PROCEDURE — 81000 URINALYSIS NONAUTO W/SCOPE: CPT

## 2020-11-26 PROCEDURE — 82274 ASSAY TEST FOR BLOOD FECAL: CPT

## 2020-11-26 PROCEDURE — 84484 ASSAY OF TROPONIN QUANT: CPT

## 2020-11-26 PROCEDURE — 83605 ASSAY OF LACTIC ACID: CPT

## 2020-11-26 PROCEDURE — 76705 ECHO EXAM OF ABDOMEN: CPT

## 2020-11-26 PROCEDURE — 80053 COMPREHEN METABOLIC PANEL: CPT

## 2020-11-26 PROCEDURE — 80306 DRUG TEST PRSMV INSTRMNT: CPT

## 2020-11-26 PROCEDURE — 86141 C-REACTIVE PROTEIN HS: CPT

## 2020-11-26 PROCEDURE — 36415 COLL VENOUS BLD VENIPUNCTURE: CPT

## 2020-11-26 PROCEDURE — 86900 BLOOD TYPING SEROLOGIC ABO: CPT

## 2020-11-26 PROCEDURE — 83690 ASSAY OF LIPASE: CPT

## 2020-11-26 PROCEDURE — 71045 X-RAY EXAM CHEST 1 VIEW: CPT

## 2020-11-26 PROCEDURE — 86920 COMPATIBILITY TEST SPIN: CPT

## 2020-11-26 PROCEDURE — 71260 CT THORAX DX C+: CPT

## 2020-11-26 RX ADMIN — SODIUM CHLORIDE SCH MLS/HR: 900 INJECTION, SOLUTION INTRAVENOUS at 20:54

## 2020-11-26 RX ADMIN — SODIUM CHLORIDE SCH MLS/HR: 900 INJECTION, SOLUTION INTRAVENOUS at 20:37

## 2020-11-26 RX ADMIN — PANTOPRAZOLE SODIUM SCH MG: 40 INJECTION, POWDER, FOR SOLUTION INTRAVENOUS at 20:41

## 2020-11-26 RX ADMIN — IBUPROFEN PRN MG: 600 TABLET ORAL at 22:04

## 2020-11-26 NOTE — ED GENERAL
General


Chief Complaint:  General Problems/Pain


Stated Complaint:  COVID POSITIVE/SOB


Source of Information:  Patient


Exam Limitations:  No Limitations





History of Present Illness


Date Seen by Provider:  2020


Time Seen by Provider:  16:23


Initial Comments


To ER by EMS from home with reports that she is Covid positive and short of tamiko

ath.  She states that she tested positive when she was swabbed at a 

dermatologist appointment on Friday of last week.  She states that she was not 

symptomatic at that time but went in to be seen for her psoriasis and received a

cortisone injection.  She was then on Friday night and was told that she was 

positive.  She became symptomatic with shortness of breath on .  She stat

es that she is allergic to cortisone and that her liver does not process it.  

She states last time she had it about 20 years ago she had to have fluids 

drained off of her lung.  She believes her lungs are filling up with water.


Timing/Duration:  4-5 Days


Severity:  Moderate


Associated Systoms:  Denies Symptoms





Allergies and Home Medications


Allergies


Coded Allergies:  


     Sulfa (Sulfonamide Antibiotics) (Verified  Allergy, Unknown, 20)





Home Medications


Furosemide 40 Mg Tablet, 40 MG PO DAILY, (Reported)


Pantoprazole Sodium 40 Mg Tablet.dr, 40 MG PO DAILY, (Reported)


Spironolactone 100 Mg Tablet, 100 MG PO DAILY, (Reported)


Sucralfate 1 Gm Tablet, 1 GM PO BID, (Reported)





Patient Home Medication List


Home Medication List Reviewed:  Yes





Review of Systems


Review of Systems


Constitutional:  see HPI


EENTM:  see HPI


Respiratory:  see HPI, short of breath


Cardiovascular:  no symptoms reported


Genitourinary:  no symptoms reported


Musculoskeletal:  no symptoms reported


Skin:  no symptoms reported


Psychiatric/Neurological:  No Symptoms Reported


Hematologic/Lymphatic:  No Symptoms Reported


Immunological/Allergic:  no symptoms reported





Past Medical-Social-Family Hx


Patient Social History


2nd Hand Smoke Exposure:  No


Recent Foreign Travel:  No


Contact w/Someone Who Travel:  No


Recent Hopitalizations:  No





Immunizations Up To Date


Date of Influenza Vaccine:  Oct 22, 2020





Seasonal Allergies


Seasonal Allergies:  Yes





Past Medical History


Surgeries:  Yes


Appendectomy, Oophorectomy


Respiratory:  No


Pneumonia


Cardiac:  Yes


Hypertension


Neurological:  No


Reproductive Disorders:  No


Genitourinary:  No


Gastrointestinal:  Yes


Gastroesophageal Reflux


Musculoskeletal:  No


Endocrine:  No


HEENT:  No


Cancer:  No


Psychosocial:  No


Integumentary:  No


Blood Disorders:  Yes (low iron )


Adverse Reaction/Blood Tranf:  No





Physical Exam


Vital Signs





Vital Signs - First Documented








 20





 16:04


 


Temp 36.1


 


Pulse 78


 


Resp 24


 


B/P (MAP) 101/72 (82)


 


Pulse Ox 100


 


O2 Delivery Room Air





Capillary Refill :


Height, Weight, BMI


Height: '"


Weight: lbs. oz. kg; 17.95 BMI


Method:


General Appearance:  No Apparent Distress, WD/WN


Eyes:  Bilateral Eye Normal Inspection, Bilateral Eye PERRL, Bilateral Eye EOMI


Respiratory:  No Accessory Muscle Use, No Respiratory Distress


Cardiovascular:  Regular Rate, Rhythm, Normal Peripheral Pulses


Gastrointestinal:  Non Tender, Soft


Extremity:  Normal Capillary Refill, Normal Inspection


Neurologic/Psychiatric:  Alert, Oriented x3, Other (Her eyes closed when talking

to me, tachypneic with a respiratory rate in the upper 20s.  Oxygen saturation 

100% room air.  Heart rate 101.  Blood pressure 101/72.  Very anxious 

appearing.)


Skin:  Normal Color, Warm/Dry





Focused Exam


Lactate Level


20 16:10: Lactic Acid Level 5.98*H


20 18:30: 





Lactic Acid Level





Laboratory Tests








Test


 20


16:10 20


18:30


 


Lactic Acid Level


 5.98 MMOL/L


(0.50-2.00)  *H 














Progress/Results/Core Measures


Suspected Sepsis


SIRS


Temperature: 


Pulse:  


Respiratory Rate: 


 


Laboratory Tests


20 16:10: White Blood Count 19.7H


Blood Pressure  / 


Mean: 


 





20 16:10: Lactic Acid Level 5.98*H


20 18:30: 


Laboratory Tests


20 16:10: 


Creatinine 0.66, Platelet Count 111L, Total Bilirubin 1.2H








Results/Orders


Lab Results





Laboratory Tests








Test


 20


16:10 20


17:30 20


18:30 Range/Units


 


 


White Blood Count


 19.7 H


 


 


 4.3-11.0


10^3/uL


 


Red Blood Count


 2.97 L


 


 


 3.80-5.11


10^6/uL


 


Hemoglobin 9.4 L   11.5-16.0  g/dL


 


Hematocrit 30 L   35-52  %


 


Mean Corpuscular Volume 102 H   80-99  fL


 


Mean Corpuscular Hemoglobin 32    25-34  pg


 


Mean Corpuscular Hemoglobin


Concent 31 L


 


 


 32-36  g/dL





 


Red Cell Distribution Width 15.3 H   10.0-14.5  %


 


Platelet Count


 111 L


 


 


 130-400


10^3/uL


 


Mean Platelet Volume 12.4 H   9.0-12.2  fL


 


Immature Granulocyte % (Auto) 1     %


 


Neutrophils (%) (Auto) 84 H   42-75  %


 


Lymphocytes (%) (Auto) 7 L   12-44  %


 


Monocytes (%) (Auto) 8    0-12  %


 


Eosinophils (%) (Auto) 0    0-10  %


 


Basophils (%) (Auto) 0    0-10  %


 


Neutrophils # (Auto)


 16.5 H


 


 


 1.8-7.8


10^3/uL


 


Lymphocytes # (Auto)


 1.4 


 


 


 1.0-4.0


10^3/uL


 


Monocytes # (Auto)


 1.5 H


 


 


 0.0-1.0


10^3/uL


 


Eosinophils # (Auto)


 0.0 


 


 


 0.0-0.3


10^3/uL


 


Basophils # (Auto)


 0.0 


 


 


 0.0-0.1


10^3/uL


 


Immature Granulocyte # (Auto)


 0.3 H


 


 


 0.0-0.1


10^3/uL


 


Neutrophils % (Manual) 92     %


 


Lymphocytes % (Manual) 4     %


 


Monocytes % (Manual) 3     %


 


Eosinophils % (Manual) 1     %


 


Blood Morphology Comment NORMAL     


 


Sodium Level 135    135-145  MMOL/L


 


Potassium Level 4.2    3.6-5.0  MMOL/L


 


Chloride Level 102      MMOL/L


 


Carbon Dioxide Level 17 L   21-32  MMOL/L


 


Anion Gap 16 H   5-14  MMOL/L


 


Blood Urea Nitrogen 41 H   7-18  MG/DL


 


Creatinine


 0.66 


 


 


 0.60-1.30


MG/DL


 


Estimat Glomerular Filtration


Rate > 60 


 


 


  





 


BUN/Creatinine Ratio 62     


 


Glucose Level 164 H     MG/DL


 


Lactic Acid Level


 5.98 *H


 


 


 0.50-2.00


MMOL/L


 


Calcium Level 9.8    8.5-10.1  MG/DL


 


Corrected Calcium 10.6 H   8.5-10.1  MG/DL


 


Total Bilirubin 1.2 H   0.1-1.0  MG/DL


 


Aspartate Amino Transf


(AST/SGOT) 45 H


 


 


 5-34  U/L





 


Alanine Aminotransferase


(ALT/SGPT) 30 


 


 


 0-55  U/L





 


Alkaline Phosphatase 80      U/L


 


Troponin I < 0.028    <0.028  NG/ML


 


C-Reactive Protein High


Sensitivity 0.48 


 


 


 0.00-0.50


MG/DL


 


Total Protein 6.1 L   6.4-8.2  GM/DL


 


Albumin 3.0 L   3.2-4.5  GM/DL


 


Procalcitonin 0.14 H   <0.10  NG/ML


 


Urine Color  YELLOW    


 


Urine Clarity  CLEAR    


 


Urine pH  7.0   5-9  


 


Urine Specific Gravity  1.010 L  1.016-1.022  


 


Urine Protein  NEGATIVE   NEGATIVE  


 


Urine Glucose (UA)  NEGATIVE   NEGATIVE  


 


Urine Ketones  NEGATIVE   NEGATIVE  


 


Urine Nitrite  NEGATIVE   NEGATIVE  


 


Urine Bilirubin  NEGATIVE   NEGATIVE  


 


Urine Urobilinogen  0.2   < = 1.0  MG/DL


 


Urine Leukocyte Esterase  1+ H  NEGATIVE  


 


Urine RBC (Auto)  2+ H  NEGATIVE  


 


Urine RBC  NONE    /HPF


 


Urine WBC  10-25 H   /HPF


 


Urine Squamous Epithelial


Cells 


 0-2 


 


  /HPF





 


Urine Crystals  NONE    /LPF


 


Urine Bacteria  TRACE    /HPF


 


Urine Casts  NONE    /LPF


 


Urine Mucus  NEGATIVE    /LPF


 


Urine Culture Indicated  YES    


 


Urine Opiates Screen  NEGATIVE   NEGATIVE  


 


Urine Oxycodone Screen  NEGATIVE   NEGATIVE  


 


Urine Methadone Screen  NEGATIVE   NEGATIVE  


 


Urine Propoxyphene Screen  NEGATIVE   NEGATIVE  


 


Urine Barbiturates Screen  NEGATIVE   NEGATIVE  


 


Ur Tricyclic Antidepressants


Screen 


 NEGATIVE 


 


 NEGATIVE  





 


Urine Phencyclidine Screen  NEGATIVE   NEGATIVE  


 


Urine Amphetamines Screen  NEGATIVE   NEGATIVE  


 


Urine Methamphetamines Screen  NEGATIVE   NEGATIVE  


 


Urine Benzodiazepines Screen  NEGATIVE   NEGATIVE  


 


Urine Cocaine Screen  NEGATIVE   NEGATIVE  


 


Urine Cannabinoids Screen  NEGATIVE   NEGATIVE  








My Orders





Orders - RAMAN MORGAN


Ondansetron Injection (Zofran Injectio (20 16:15)


Lorazepam Injection (Ativan Injection) (20 16:15)


Cbc With Automated Diff (20 16:13)


Hs C Reactive Protein (20 16:13)


Comprehensive Metabolic Panel (20 16:13)


Procalcitonin (Pct) (20 16:13)


Chest 1 View, Ap/Pa Only (20 16:13)


Troponin I (20 16:13)


Ekg Tracing (20 16:13)


Ua Culture If Indicated (20 16:22)


Drug Screen Stat (Urine) (20 16:22)


Lactated Ringers (Lr 1000 Ml Iv Solution (20 16:30)


Manual Differential (20 16:10)


Lactic Acid Analyzer (20 16:57)


Ct Chest/Abdomen/Pelvis W (20 17:12)


Pantoprazole Injection (Protonix Injecti (20 17:15)


Ns Iv 500 Ml (Sodium Chloride 0.9%) (20 17:15)


Iohexol Injection (Omnipaque 350 Mg/Ml 1 (20 17:45)


Received Contrast (Hold Metformin- Contr (20 17:45)


Ns (Ivpb) (Sodium Chloride 0.9% Ivpb Bag (20 17:45)


Urine Culture (20 17:30)


Ceftriaxone  For Iv Use (Rocephin  For I (20 18:15)





Medications Given in ED





Current Medications








 Medications  Dose


 Ordered  Sig/Abisai


 Route  Start Time


 Stop Time Status Last Admin


Dose Admin


 


 Iohexol  100 ml  ONCE  ONCE


 IV  20 17:45


 20 17:46 DC 20 18:12


66 ML


 


 Lorazepam  0.5 mg  ONCE  PRN


 IVP  20 16:15


    20 16:31


0.5 MG


 


 Ondansetron HCl  4 mg  ONCE  ONCE


 IVP  20 16:15


 20 16:16 DC 20 16:31


4 MG


 


 Pantoprazole  40 mg  ONCE  ONCE


 IV  20 17:15


 20 17:16 DC 20 17:24


40 MG


 


 Sodium Chloride  100 ml  ONCE  ONCE


 IV  20 17:45


 20 17:46 DC 20 18:12


80 ML








Vital Signs/I&O











 20





 16:04


 


Temp 36.1


 


Pulse 78


 


Resp 24


 


B/P (MAP) 101/72 (82)


 


Pulse Ox 100


 


O2 Delivery Room Air





Capillary Refill :





Diagnostic Imaging





   Diagonstic Imaging:  Xray


Comments


NAME:   CODI STEVENS


MED REC#:   [id number]


ACCOUNT#:   [id number]


PT STATUS:   REG ER


:   1965


PHYSICIAN:   RAMAN MORGAN


ADMIT DATE:   20/ER


                                  ***Signed***


Date of Exam:20





CHEST 1 VIEW, AP/PA ONLY








PATIENT HISTORY: Cough. 





TECHNIQUE: Single frontal view of the chest.





COMPARISON: None.





FINDINGS: The lung volumes are large. No focal consolidation is


seen. No large pleural effusion or pneumothorax is seen. The


cardiomediastinal silhouette is normal in size and contour. No


acute osseous abnormality is seen.





IMPRESSION: No acute pulmonary abnormality seen.





Dictated by: 





  Dictated on workstation # [id number]








Dict:   20


Trans:   20


[facility] [id number]





Interpreted by:     SALLY SCHREIBER MD


Electronically signed by: SALLY SCHREIBER MD 20


NAME:   CODI STEVENS


MED REC#:   [id number]


ACCOUNT#:   [id number]


PT STATUS:   REG ER


:   1965


PHYSICIAN:   RAMAN MORGAN APRN


ADMIT DATE:   20/ER


                                  ***Signed***


Date of Exam:20





CT CHEST/ABDOMEN/PELVIS W








PROCEDURE: CT chest, abdomen, and pelvis with contrast.





TECHNIQUE: Multiple contiguous axial images were obtained through


the chest, abdomen, and pelvis after the administration of


intravenous contrast. Auto Exposure Controls were utilized during


the CT exam to meet ALARA standards for radiation dose reduction.








DATE: 2020.





COMPARISON: Chest radiograph 2020. CT abdomen pelvis


2020. 





INDICATION: 55-year-old female, fever, weakness. Elevated lactic


acid. Epigastric pain. Covid positive. Black stool. Shortness of


breath.





FINDINGS: There is a 3 mm noncalcified right lower lobe pulmonary


nodule on axial image 33. There is no lung mass. There is no


additional focal airspace consolidation. There is no


pneumothorax. There is no pleural effusion. The central airways


are patent.





There is no identified central or segmental pulmonary embolus.


The heart is not enlarged. There is no pericardial effusion.





There is no identified abnormally enlarged mediastinal, hilar or


axillary lymph node meeting CT size criteria for adenopathy.





The liver is mildly nodular in outer contour compatible with


cirrhosis. There is no identified focal liver lesion. There is a


dilated recanalized paraumbilical vein. There is a dilated


collateral vein protruding in the region of the umbilicus. The


main portal vein is patent. The gallbladder is not distended.


There is cholelithiasis. There is very small amount of


pericholecystic fluid without prominent adjacent inflammatory


stranding. There is no identified intrahepatic or extrahepatic


bile duct dilation. The main pancreatic duct is not abnormally


dilated. There is a very small amount of fluid attenuation and


stranding adjacent to the pancreas. The spleen is normal in size.


The adrenal glands are unremarkable.





Unremarkable appearance of the renal parenchyma. The urinary


collecting systems are not distended. There is no identified


renal or ureteral stone. The urinary bladder is unremarkable.





The intestinal tract is not distended. The appendix is not well


seen. There is no identified secondary findings to specifically


suggest acute appendicitis. There is no free intraperitoneal air.


There is no focal drainable fluid collection.





There are atherosclerotic calcifications. There is no identified


abnormally enlarged lymph node in the abdomen or pelvis


specifically meeting CT size criteria for adenopathy.





There is no identified acute bony abnormality.





IMPRESSION: 


CT Chest, abdomen and pelvis:


1. No identified acute cardiopulmonary abnormality.


2. Cirrhosis with dilated and recanalized paraumbilical vein and


dilated collateral vein protruding in the region of the


umbilicus. Minimal ascites.


3. Very small amount of fluid attenuation adjacent to the


pancreas without disruption of the pancreatic parenchyma


architecture. This could relate to minimal ascites although


correlation with laboratory values is recommended as acute


pancreatitis would be difficult to exclude.


4. Cholelithiasis without evidence of acute cholecystitis. No


biliary ductal dilation.





Dictated by: 





  Dictated on workstation # [id number]








Dict:   20


Trans:   20


[facility] [id number]





Interpreted by:     ELBA WOLF MD


Electronically signed by: ELBA WOLF MD 20





Departure


Impression





   Primary Impression:  


   UTI (urinary tract infection)


   Additional Impression:  


   COVID-19


Disposition:  09 ADMITTED AS INPATIENT


Condition:  Stable





Admissions


Decision to Admit Reason:  Admit from ER (General)


Decision to Admit/Date:  2020


Time/Decision to Admit Time:  18:44





Departure-Patient Inst.


Referrals:  


[facility] [place]


Primary Care Physician











RAMAN MORGAN             2020 16:26

## 2020-11-26 NOTE — DIAGNOSTIC IMAGING REPORT
PATIENT HISTORY: Cough. 



TECHNIQUE: Single frontal view of the chest.



COMPARISON: None.



FINDINGS: The lung volumes are large. No focal consolidation is

seen. No large pleural effusion or pneumothorax is seen. The

cardiomediastinal silhouette is normal in size and contour. No

acute osseous abnormality is seen.



IMPRESSION: No acute pulmonary abnormality seen.



Dictated by: 



  Dictated on workstation # [id number]

## 2020-11-26 NOTE — NUR
CODI STEVENS admitted to room 423-1, with an admitting diagnosis of UTI, SEVERE SEPSIS, 
CIRRHOSIS, COVID, UPPER GI BLEED , on 11/26/20 from ED via , accompanied by STAFF 
.CODI STEVENS introduced to surroundings, call light, bed controls, phone, TV, temperature 
control, lights, meal times, smoking policy, visitor policy, side rail policy, bathrooms and 
showers.  Patient Rights given to patient in the handbook.CODI STEVENS verbalizes 
understanding that [facility] is not responsible for the loss or damage to any personal 
effects or valuables that are kept in the patients posession during their hospitalization.

## 2020-11-26 NOTE — DIAGNOSTIC IMAGING REPORT
PROCEDURE: CT chest, abdomen, and pelvis with contrast.



TECHNIQUE: Multiple contiguous axial images were obtained through

the chest, abdomen, and pelvis after the administration of

intravenous contrast. Auto Exposure Controls were utilized during

the CT exam to meet ALARA standards for radiation dose reduction.





DATE: November 26, 2020.



COMPARISON: Chest radiograph November 26, 2020. CT abdomen pelvis

June 9, 2020. 



INDICATION: 55-year-old female, fever, weakness. Elevated lactic

acid. Epigastric pain. Covid positive. Black stool. Shortness of

breath.



FINDINGS: There is a 3 mm noncalcified right lower lobe pulmonary

nodule on axial image 33. There is no lung mass. There is no

additional focal airspace consolidation. There is no

pneumothorax. There is no pleural effusion. The central airways

are patent.



There is no identified central or segmental pulmonary embolus.

The heart is not enlarged. There is no pericardial effusion.



There is no identified abnormally enlarged mediastinal, hilar or

axillary lymph node meeting CT size criteria for adenopathy.



The liver is mildly nodular in outer contour compatible with

cirrhosis. There is no identified focal liver lesion. There is a

dilated recanalized paraumbilical vein. There is a dilated

collateral vein protruding in the region of the umbilicus. The

main portal vein is patent. The gallbladder is not distended.

There is cholelithiasis. There is very small amount of

pericholecystic fluid without prominent adjacent inflammatory

stranding. There is no identified intrahepatic or extrahepatic

bile duct dilation. The main pancreatic duct is not abnormally

dilated. There is a very small amount of fluid attenuation and

stranding adjacent to the pancreas. The spleen is normal in size.

The adrenal glands are unremarkable.



Unremarkable appearance of the renal parenchyma. The urinary

collecting systems are not distended. There is no identified

renal or ureteral stone. The urinary bladder is unremarkable.



The intestinal tract is not distended. The appendix is not well

seen. There is no identified secondary findings to specifically

suggest acute appendicitis. There is no free intraperitoneal air.

There is no focal drainable fluid collection.



There are atherosclerotic calcifications. There is no identified

abnormally enlarged lymph node in the abdomen or pelvis

specifically meeting CT size criteria for adenopathy.



There is no identified acute bony abnormality.



IMPRESSION: 

CT Chest, abdomen and pelvis:

1. No identified acute cardiopulmonary abnormality.

2. Cirrhosis with dilated and recanalized paraumbilical vein and

dilated collateral vein protruding in the region of the

umbilicus. Minimal ascites.

3. Very small amount of fluid attenuation adjacent to the

pancreas without disruption of the pancreatic parenchyma

architecture. This could relate to minimal ascites although

correlation with laboratory values is recommended as acute

pancreatitis would be difficult to exclude.

4. Cholelithiasis without evidence of acute cholecystitis. No

biliary ductal dilation.



Dictated by: 



  Dictated on workstation # [id number]

## 2020-11-27 VITALS — SYSTOLIC BLOOD PRESSURE: 124 MMHG | DIASTOLIC BLOOD PRESSURE: 80 MMHG

## 2020-11-27 VITALS — SYSTOLIC BLOOD PRESSURE: 99 MMHG | DIASTOLIC BLOOD PRESSURE: 63 MMHG

## 2020-11-27 VITALS — SYSTOLIC BLOOD PRESSURE: 101 MMHG | DIASTOLIC BLOOD PRESSURE: 63 MMHG

## 2020-11-27 VITALS — DIASTOLIC BLOOD PRESSURE: 58 MMHG | SYSTOLIC BLOOD PRESSURE: 93 MMHG

## 2020-11-27 VITALS — DIASTOLIC BLOOD PRESSURE: 73 MMHG | SYSTOLIC BLOOD PRESSURE: 122 MMHG

## 2020-11-27 VITALS — SYSTOLIC BLOOD PRESSURE: 102 MMHG | DIASTOLIC BLOOD PRESSURE: 65 MMHG

## 2020-11-27 VITALS — SYSTOLIC BLOOD PRESSURE: 108 MMHG | DIASTOLIC BLOOD PRESSURE: 64 MMHG

## 2020-11-27 VITALS — DIASTOLIC BLOOD PRESSURE: 66 MMHG | SYSTOLIC BLOOD PRESSURE: 103 MMHG

## 2020-11-27 VITALS — DIASTOLIC BLOOD PRESSURE: 63 MMHG | SYSTOLIC BLOOD PRESSURE: 102 MMHG

## 2020-11-27 LAB
ALBUMIN SERPL-MCNC: 2.4 GM/DL (ref 3.2–4.5)
ALP SERPL-CCNC: 59 U/L (ref 40–136)
ALT SERPL-CCNC: 22 U/L (ref 0–55)
BASOPHILS # BLD AUTO: 0 10^3/UL (ref 0–0.1)
BASOPHILS NFR BLD AUTO: 0 % (ref 0–10)
BILIRUB SERPL-MCNC: 0.7 MG/DL (ref 0.1–1)
BUN/CREAT SERPL: 48
CALCIUM SERPL-MCNC: 7.6 MG/DL (ref 8.5–10.1)
CHLORIDE SERPL-SCNC: 108 MMOL/L (ref 98–107)
CO2 SERPL-SCNC: 20 MMOL/L (ref 21–32)
CREAT SERPL-MCNC: 0.6 MG/DL (ref 0.6–1.3)
EOSINOPHIL # BLD AUTO: 0.2 10^3/UL (ref 0–0.3)
EOSINOPHIL NFR BLD AUTO: 2 % (ref 0–10)
GFR SERPLBLD BASED ON 1.73 SQ M-ARVRAT: > 60 ML/MIN
GLUCOSE SERPL-MCNC: 93 MG/DL (ref 70–105)
HCT VFR BLD CALC: 21 % (ref 35–52)
HGB BLD-MCNC: 6.6 G/DL (ref 11.5–16)
LYMPHOCYTES # BLD AUTO: 0.9 10^3/UL (ref 1–4)
LYMPHOCYTES NFR BLD AUTO: 11 % (ref 12–44)
MANUAL DIFFERENTIAL PERFORMED BLD QL: NO
MCH RBC QN AUTO: 32 PG (ref 25–34)
MCHC RBC AUTO-ENTMCNC: 31 G/DL (ref 32–36)
MCV RBC AUTO: 101 FL (ref 80–99)
MONOCYTES # BLD AUTO: 0.7 10^3/UL (ref 0–1)
MONOCYTES NFR BLD AUTO: 8 % (ref 0–12)
NEUTROPHILS # BLD AUTO: 6.9 10^3/UL (ref 1.8–7.8)
NEUTROPHILS NFR BLD AUTO: 79 % (ref 42–75)
PLATELET # BLD: 70 10^3/UL (ref 130–400)
PMV BLD AUTO: 12.6 FL (ref 9–12.2)
POTASSIUM SERPL-SCNC: 3.6 MMOL/L (ref 3.6–5)
PROT SERPL-MCNC: 4.5 GM/DL (ref 6.4–8.2)
SODIUM SERPL-SCNC: 137 MMOL/L (ref 135–145)
WBC # BLD AUTO: 8.7 10^3/UL (ref 4.3–11)

## 2020-11-27 RX ADMIN — SODIUM CHLORIDE SCH MLS/HR: 900 INJECTION, SOLUTION INTRAVENOUS at 20:57

## 2020-11-27 RX ADMIN — IBUPROFEN PRN MG: 600 TABLET ORAL at 09:50

## 2020-11-27 RX ADMIN — SODIUM CHLORIDE SCH MLS/HR: 900 INJECTION, SOLUTION INTRAVENOUS at 20:59

## 2020-11-27 RX ADMIN — SODIUM CHLORIDE SCH MLS/HR: 900 INJECTION, SOLUTION INTRAVENOUS at 05:12

## 2020-11-27 RX ADMIN — PANTOPRAZOLE SODIUM SCH MG: 40 INJECTION, POWDER, FOR SOLUTION INTRAVENOUS at 17:00

## 2020-11-27 RX ADMIN — SODIUM CHLORIDE SCH MLS/HR: 900 INJECTION, SOLUTION INTRAVENOUS at 15:22

## 2020-11-27 RX ADMIN — SODIUM CHLORIDE SCH MLS/HR: 900 INJECTION, SOLUTION INTRAVENOUS at 04:42

## 2020-11-27 RX ADMIN — PANTOPRAZOLE SODIUM SCH MG: 40 INJECTION, POWDER, FOR SOLUTION INTRAVENOUS at 05:12

## 2020-11-27 NOTE — HISTORY & PHYSICAL-HOSPITALIST
History of Present Illness


HPI/Chief Complaint


CC: Hypoxia with weakness





HPI: This is a 55 yoWF clinic patient of [facility] who presented to the ER with 

hypoxia and weakness. COVID swab taken and pending at time of exam. GIB 

suspected to Dr Ramirez consulted. Repeat labs in morning revealed hgb 6.6 so 

transfused 2 units of blood. Black stools reported per patient. O2 maintained.


Source:  patient


Exam Limitations:  no limitations


Date Seen


20


Time Seen by a Provider:  11:00


Attending Physician


Gerri Solorzano DO


[facility]


Referring Physician





Date of Admission


2020 at 19:10





Home Medications & Allergies


Home Medications


Reviewed patient Home Medication Reconciliation performed by pharmacy medication

reconciliations technician and/or nursing.


Patients Allergies have been reviewed.





Allergies





Allergies


Coded Allergies


  Sulfa (Sulfonamide Antibiotics) (Verified Allergy, Unknown, 20)








Past Medical-Social-Family Hx


Past Med/Social Hx:  Reviewed Nursing Past Med/Soc Hx, Reviewed and Corrections 

made


Patient Social History


Marrital Status:  single


Employed/Student:  unemployed


Alcohol Use:  Denies Use


Recreational Drug Use:  No


Smoking Status:  Current Everyday Smoker


2nd Hand Smoke Exposure:  No


Recent Foreign Travel:  No


Contact w/other who traveled:  No


Recent Hopitalizations:  No


Recent Infectious Disease Expo:  No





Immunizations Up To Date


Tetanus Booster (TDap):  Unknown


Date of Influenza Vaccine:  Oct 22, 2020





Seasonal Allergies


Seasonal Allergies:  Yes





Past Medical History


Surgeries:  Appendectomy, Oophorectomy


Cardiac:  Hypertension


Reproductive:  No


Gastrointestinal:  Gastroesophageal Reflux


History of Blood Disorders:  Yes (low iron )


Adverse Reaction to Blood Soares:  No





Family History





Patient reports no known family medical history.





Review of Systems


Constitutional:  see HPI, malaise, weakness


Respiratory:  dyspnea on exertion





Physical Exam


Physical Exam


Vital Signs





Vital Signs - First Documented








 20





 16:04


 


Temp 36.1


 


Pulse 78


 


Resp 24


 


B/P (MAP) 101/72 (82)


 


Pulse Ox 100


 


O2 Delivery Room Air





Capillary Refill : Less Than 3 Seconds


Height, Weight, BMI


Height: '"


Weight: lbs. oz. kg; 24.30 BMI


Method:


General Appearance:  No Apparent Distress, Chronically ill, Thin


Eyes:  Right Eye Normal Inspection, Right Eye PERRL


HEENT:  PERRL/EOMI, Normal ENT Inspection, Pharynx Normal, Moist Mucous 

Membranes


Neck:  Full Range of Motion, Normal Inspection, Non Tender


Respiratory:  Chest Non Tender, Lungs Clear, Normal Breath Sounds, No Accessory 

Muscle Use, No Respiratory Distress


Cardiovascular:  Regular Rate, Rhythm, No Edema, No Gallop, No JVD, No Murmur, 

Normal Peripheral Pulses


Gastrointestinal:  Normal Bowel Sounds, No Organomegaly, No Pulsatile Mass, Non 

Tender, Soft


Back:  Normal Inspection, No CVA Tenderness, No Vertebral Tenderness


Extremity:  Normal Capillary Refill, Normal Inspection, Normal Range of Motion, 

Non Tender, No Calf Tenderness, No Pedal Edema


Neurologic/Psychiatric:  Alert, Oriented x3, No Motor/Sensory Deficits, Normal 

Mood/Affect


Skin:  Normal Color, Warm/Dry


Lymphatic:  No Adenopathy





Results


Results/Procedures


Labs


Laboratory Tests


20 16:10








20 05:20








Patient resulted labs reviewed.





Assessment/Plan


Admission Diagnosis


Assessment:


GIB


Sepsis


UTI


Cirrhosis


Weakness


Anemia





Plan:


Monitor hgb


Transfuse


PPI


Dr Ramirez consultation


Admission Status:  Inpatient Order (span 2 midnights)


Reason for Inpatient Admission:  


GIB





Diagnosis/Problems


Diagnosis/Problems





(1) Upper GI bleed


(2) UTI (urinary tract infection)


Status:  Acute


(3) Sepsis


(4) Cirrhosis


(5) Anemia


(6) Rectal bleeding





Clinical Quality Measures


DVT/VTE Risk/Contraindication:


Risk Factor Score Per Nursin


RFS Level Per Nursing on Admit:  4+=Very High











GERRI SOLORZANO DO                2020 07:00

## 2020-11-27 NOTE — NUR
SPOKE WITH THE PT (I CALLED HER ROOM PHONE) AND WENT THRU THE EXT MED HISTORY TO COMPLETE 
THE MED REC



PT HAD A HARD TIME NAMING HER MEDICATIONS, I SAID THE NAMES OF MEDS THAT WERE ON THE EXT MED 
HISTORY AND PT WAS ABLE TO VERIFY SHE WAS TAKING THEM BUT COULDNT ALWAYS GIVE ME DIRECTIONS 
ON HOW SHE TAKES THEM. 



FUROSEMIDE 40MG WAS LAST FILLED 09- #30/30DS- I DOCUMENTED THE PAST DUE FILL ON THE 
MED REC



PANTOPRAZOLE 40MG- DIRECTIONS ARE 1 TAB BID HOWEVER PT THINKS SHE TAKES 1 TAB DAILY



OTC MEDS:

NONE

## 2020-11-27 NOTE — CONSULTATION - SURGERY
History of Present Illness


History of Present Illness


Patient Consulted On(sandra/time)


20


 11:34


Date Seen by Provider:  2020


Time Seen by Provider:  11:34


History of Present Illness


Consult requested by Dr. Solorzano for anemia.





Patient is a 55-year-old female admitted for UTI/Covid 19. She states she has 

been ill for about 1 week.  Patient states that she went to the dermatologist 

and received a shot of cortisone which she states that she should not have 

received because she usually feels very ill with it.  Patient also had a 

biopsies performed as well.  Patient states that she was tested for Covid and 

reports that she was positive.  Patient states that soon after that office visit

she began developing shortness of breath which worsened over the last 3 days.  

She states she is also been having multiple diarrhea stools and they were 

extremely black the last couple days. She has also in last week experience 

nausea and vomiting.  Patient states the diarrhea has slightly improved today.  

She is feeling a little bit better, she is starting to receive some packed red 

blood cells.  Her hemoglobin dropped down to 6 range.  She is tolerating clear 

liquids today.  She had an elevated lactic however with fluids it returned to 

normal.  Denies any fever sweats chills or chest pain at this time.  She had a 

CT scan performed which demonstrated cirrhotic appearance of liver.,  

Cholelithiasis without changes of cholecystitis, some changes around the 

pancreatic head and a recanalized umbilical vein.





Allergies and Home Medications


Allergies


Coded Allergies:  


     Sulfa (Sulfonamide Antibiotics) (Verified  Allergy, Unknown, 20)





Home Medications


Ergocalciferol (Vitamin D2) 1,250 Mcg Capsule, 1,250 MCG PO FRI, (Reported)


Famotidine 20 Mg Tablet, 20 MG PO DAILY, (Reported)


Furosemide 40 Mg Tablet, 40 MG PO DAILY, (Reported)


   LAST FILLED 2020 #30/30 DAY SUPPLY 


Hydroxyzine HCl 25 Mg Tablet, 25 MG PO Q8H PRN for ANXIETY, (Reported)


Pantoprazole Sodium 40 Mg Tablet.dr, 40 MG PO DAILY, (Reported)


Paroxetine HCl 10 Mg Tablet, 10 MG PO DAILY, (Reported)


Spironolactone 100 Mg Tablet, 100 MG PO DAILY, (Reported)


Sucralfate 1 Gm Tablet, 1 GM PO QID PRN for ULCERS, (Reported)





Patient Home Medication List


Home Medication List Reviewed:  Yes





Past Medical-Social-Family Hx


Patient Social History


Alcohol Use:  Denies Use


Recreational Drug Use:  No


2nd Hand Smoke Exposure:  No


Recent Foreign Travel:  No


Contact w/Someone Who Travel:  No


Recent Infectious Disease Expo:  No


Recent Hopitalizations:  No





Immunizations Up To Date


Tetanus Booster (TDap):  Unknown


Date of Influenza Vaccine:  Oct 22, 2020





Seasonal Allergies


Seasonal Allergies:  Yes





Surgeries


History of Surgeries:  Yes


Surgeries:  Appendectomy, Oophorectomy





Respiratory


History of Respiratory Disorde:  No


Respiratory Disorders:  Pneumonia





Cardiovascular


History of Cardiac Disorders:  Yes


Cardiac Disorders:  Hypertension





Neurological


History of Neurological Disord:  No





Reproductive System


Hx Reproductive Disorders:  No





Genitourinary


History of Genitourinary Disor:  No





Gastrointestinal


History of Gastrointestinal Di:  Yes


Gastrointestinal Disorders:  Gastroesophageal Reflux, Cirrhosis





Musculoskeletal


History of Musculoskeletal Dis:  No





Endocrine


History of Endocrine Disorders:  No





HEENT


History of HEENT Disorders:  No





Cancer


History of Cancer:  No





Psychosocial


History of Psychiatric Problem:  No





Integumentary


History of Skin or Integumenta:  No





Blood Transfusions


History of Blood Disorders:  Yes (low iron )


Adverse Reaction to a Blood Tr:  No





Family Medical History


Significant Family History:  No Pertinent Family Hx


Family Medial History:  


Patient reports no known family medical history.





Review of Systems-General


Constitutional:  No chills, No diaphoresis


EENTM:  No blurred vision, No double vision


Respiratory:  dyspnea on exertion, short of breath


Cardiovascular:  No chest pain, No palpitations


Gastrointestinal:  diarrhea, melena, nausea, vomiting


Genitourinary:  No decreased output, No discharge


Musculoskeletal:  No back pain, No joint pain


Skin:  No change in color, No change in hair/nails


Psychiatric/Neurological:  Denies Anxiety, Denies Depressed, Denies Emotional 

Problems


All Other Systems Reviewed


Negative Unless Noted:  Yes (Negative excepted noted.)





Physical Exam-General Problems


Physical Exam


Vital Signs





Vital Signs - First Documented








 20





 16:04


 


Temp 36.1


 


Pulse 78


 


Resp 24


 


B/P (MAP) 101/72 (82)


 


Pulse Ox 100


 


O2 Delivery Room Air





Capillary Refill : Less Than 3 Seconds


General Appearance:  WD/WN, no apparent distress


HEENT:  PERRL/EOMI, normal ENT inspection


Neck:  non-tender, full range of motion


Respiratory:  chest non-tender, no respiratory distress, no accessory muscle use


Cardiovascular:  regular rate, rhythm, no JVD


Gastrointestinal:  non tender, soft, hernia (Umbilical)


Rectal:  deferred


Back:  no CVA tenderness, no vertebral tenderness


Extremities:  normal range of motion, normal inspection


Neurologic/Psychiatric:  alert, normal mood/affect (Normal mood flat affect), 

oriented x 3


Skin:  normal color, warm/dry


Lymphatic:  no adenopathy





Data Review


Labs


Laboratory Tests


20 16:10: 


White Blood Count 19.7H, Red Blood Count 2.97L, Hemoglobin 9.4L, Hematocrit 30L,

Mean Corpuscular Volume 102H, Mean Corpuscular Hemoglobin 32, Mean Corpuscular 

Hemoglobin Concent 31L, Red Cell Distribution Width 15.3H, Platelet Count 111L, 

Mean Platelet Volume 12.4H, Immature Granulocyte % (Auto) 1, Neutrophils (%) 

(Auto) 84H, Lymphocytes (%) (Auto) 7L, Monocytes (%) (Auto) 8, Eosinophils (%) 

(Auto) 0, Basophils (%) (Auto) 0, Neutrophils # (Auto) 16.5H, Lymphocytes # 

(Auto) 1.4, Monocytes # (Auto) 1.5H, Eosinophils # (Auto) 0.0, Basophils # 

(Auto) 0.0, Immature Granulocyte # (Auto) 0.3H, Neutrophils % (Manual) 92, 

Lymphocytes % (Manual) 4, Monocytes % (Manual) 3, Eosinophils % (Manual) 1, Bl

ood Morphology Comment NORMAL, Sodium Level 135, Potassium Level 4.2, Chloride 

Level 102, Carbon Dioxide Level 17L, Anion Gap 16H, Blood Urea Nitrogen 41H, 

Creatinine 0.66, Estimat Glomerular Filtration Rate > 60, BUN/Creatinine Ratio 

62, Glucose Level 164H, Lactic Acid Level 5.98*H, Calcium Level 9.8, Corrected 

Calcium 10.6H, Total Bilirubin 1.2H, Aspartate Amino Transf (AST/SGOT) 45H, 

Alanine Aminotransferase (ALT/SGPT) 30, Alkaline Phosphatase 80, Troponin I < 

0.028, C-Reactive Protein High Sensitivity 0.48, Total Protein 6.1L, Albumin 

3.0L, Lipase 49, Procalcitonin 0.14H


20 17:30: 


Urine Color YELLOW, Urine Clarity CLEAR, Urine pH 7.0, Urine Specific Gravity 

1.010L, Urine Protein NEGATIVE, Urine Glucose (UA) NEGATIVE, Urine Ketones 

NEGATIVE, Urine Nitrite NEGATIVE, Urine Bilirubin NEGATIVE, Urine Urobilinogen 

0.2, Urine Leukocyte Esterase 1+H, Urine RBC (Auto) 2+H, Urine RBC NONE, Urine 

WBC 10-25H, Urine Squamous Epithelial Cells 0-2, Urine Crystals NONE, Urine 

Bacteria TRACE, Urine Casts NONE, Urine Mucus NEGATIVE, Urine Culture Indicated 

YES, Urine Opiates Screen NEGATIVE, Urine Oxycodone Screen NEGATIVE, Urine 

Methadone Screen NEGATIVE, Urine Propoxyphene Screen NEGATIVE, Urine Barbiturate

s Screen NEGATIVE, Ur Tricyclic Antidepressants Screen NEGATIVE, Urine 

Phencyclidine Screen NEGATIVE, Urine Amphetamines Screen NEGATIVE, Urine 

Methamphetamines Screen NEGATIVE, Urine Benzodiazepines Screen NEGATIVE, Urine 

Cocaine Screen NEGATIVE, Urine Cannabinoids Screen NEGATIVE


20 18:30: Lactic Acid Level 1.69


20 20:15: Coronavirus 2019 (NALLELY) Negative


20 21:30: 


20 05:20: 


White Blood Count 8.7, Red Blood Count 2.09L, Hemoglobin 6.6#*L, Hematocrit 21L,

Mean Corpuscular Volume 101H, Mean Corpuscular Hemoglobin 32, Mean Corpuscular 

Hemoglobin Concent 31L, Red Cell Distribution Width 15.5H, Platelet Count 70L, 

Mean Platelet Volume 12.6H, Immature Granulocyte % (Auto) 1, Neutrophils (%) 

(Auto) 79H, Lymphocytes (%) (Auto) 11L, Monocytes (%) (Auto) 8, Eosinophils (%) 

(Auto) 2, Basophils (%) (Auto) 0, Neutrophils # (Auto) 6.9, Lymphocytes # (Auto)

0.9L, Monocytes # (Auto) 0.7, Eosinophils # (Auto) 0.2, Basophils # (Auto) 0.0, 

Immature Granulocyte # (Auto) 0.0, Sodium Level 137, Potassium Level 3.6, 

Chloride Level 108H, Carbon Dioxide Level 20L, Anion Gap 9, Blood Urea Nitrogen 

29H, Creatinine 0.60, Estimat Glomerular Filtration Rate > 60, BUN/Creatinine 

Ratio 48, Glucose Level 93, Calcium Level 7.6L, Corrected Calcium 8.9, Total 

Bilirubin 0.7, Aspartate Amino Transf (AST/SGOT) 29, Alanine Aminotransferase 

(ALT/SGPT) 22, Alkaline Phosphatase 59, Total Protein 4.5L, Albumin 2.4L





Assessment/Plan


Anemia


Cholelithiasis


Melena


Cirrhosis


UTI


Nausea/vomiting/diarrhea


Shortness of breath





Patient is a 55-year-old female receiving PRBC currently.  She is having dark 

stools so I suspect upper GI bleed


Protonix


Clear liquids


Follow labs and transfuse PRBC as needed.


Antibiotics for UTI


No surgical intervention 


COVID-19 pending





Clinical Quality Measures


DVT/VTE Risk/Contraindication:


Risk Factor Score Per Nursin


RFS Level Per Nursing on Admit:  4+=Very High











TIFFANIE OVALLE DO              2020 15:40

## 2020-11-27 NOTE — NUR
H&H 6.6, 21

DR. LEWIS NOTIFIED, NEW ORDER RECEIVED: GIVE 2 UNITS PRBC'S AND OCCULT BLOOD STOOLS. 

WILL CONTINUE TO MONITOR.

## 2020-11-27 NOTE — DIAGNOSTIC IMAGING REPORT
EXAM: RIGHT UPPER QUADRANT ULTRASOUND 



DATE: November 27, 2020.



COMPARISON: CT chest abdomen pelvis November 26, 2020. 



INDICATION: 55-year-old female, history of gastrointestinal

bleed. 



PROCEDURE: 



Two-dimensional grayscale and color doppler ultrasound

examination of the right upper quadrant is performed. 



FINDINGS: 



Liver: The latter liver contours are minimally nodular. There is

no demonstrated focal liver lesion. There is a recanalized an

abnormally dilated periumbilical vein. The main portal vein is

patent with normal directional flow and velocity. The visualized

hepatic veins are patent.



Bile ducts and gallbladder: There are shadowing gallstones. The

gallbladder is mildly distended. There is no pericholecystic

fluid. The gallbladder wall measures 0.2 cm.  There is no

intrahepatic or extrahepatic biliary ductal dilation. The common

bile duct measures 0.4 cm. 



Right kidney: Unremarkable right kidney. No hydronephrosis. The

right kidney measures 9.8 cm x 5.5 cm x 4.0 cm. 



Pancreas: Very limited ultrasound assessment of the pancreas is

unremarkable. 



IMPRESSION: 

1. Abnormally dilated recanalized paraumbilical vein suggesting

portal hypertension.

2. The liver contours are minimally nodular suggesting cirrhosis.

3. No demonstrated focal liver lesion.

4. Cholelithiasis without specific findings to suggest acute

cholecystitis.

5. No biliary ductal dilation.

6. Very limited pancreatic parenchymal assessment on ultrasound

is unremarkable. Based on prior CT appearance, correlation with

lipase levels is recommended.



 



Dictated by: 



  Dictated on workstation # [id number]

## 2020-11-28 VITALS — DIASTOLIC BLOOD PRESSURE: 60 MMHG | SYSTOLIC BLOOD PRESSURE: 130 MMHG

## 2020-11-28 VITALS — DIASTOLIC BLOOD PRESSURE: 66 MMHG | SYSTOLIC BLOOD PRESSURE: 117 MMHG

## 2020-11-28 VITALS — DIASTOLIC BLOOD PRESSURE: 72 MMHG | SYSTOLIC BLOOD PRESSURE: 148 MMHG

## 2020-11-28 VITALS — DIASTOLIC BLOOD PRESSURE: 73 MMHG | SYSTOLIC BLOOD PRESSURE: 132 MMHG

## 2020-11-28 VITALS — DIASTOLIC BLOOD PRESSURE: 65 MMHG | SYSTOLIC BLOOD PRESSURE: 121 MMHG

## 2020-11-28 VITALS — DIASTOLIC BLOOD PRESSURE: 62 MMHG | SYSTOLIC BLOOD PRESSURE: 131 MMHG

## 2020-11-28 LAB
ALBUMIN SERPL-MCNC: 2.7 GM/DL (ref 3.2–4.5)
ALP SERPL-CCNC: 64 U/L (ref 40–136)
ALT SERPL-CCNC: 32 U/L (ref 0–55)
BILIRUB SERPL-MCNC: 1 MG/DL (ref 0.1–1)
BUN/CREAT SERPL: 23
CALCIUM SERPL-MCNC: 6.9 MG/DL (ref 8.5–10.1)
CHLORIDE SERPL-SCNC: 108 MMOL/L (ref 98–107)
CO2 SERPL-SCNC: 16 MMOL/L (ref 21–32)
CREAT SERPL-MCNC: 0.53 MG/DL (ref 0.6–1.3)
GFR SERPLBLD BASED ON 1.73 SQ M-ARVRAT: > 60 ML/MIN
GLUCOSE SERPL-MCNC: 93 MG/DL (ref 70–105)
HCT VFR BLD CALC: 29 % (ref 35–52)
HGB BLD-MCNC: 9.5 G/DL (ref 11.5–16)
MCH RBC QN AUTO: 31 PG (ref 25–34)
MCHC RBC AUTO-ENTMCNC: 33 G/DL (ref 32–36)
MCV RBC AUTO: 95 FL (ref 80–99)
PLATELET # BLD: 50 10^3/UL (ref 130–400)
PMV BLD AUTO: 11.6 FL (ref 9–12.2)
POTASSIUM SERPL-SCNC: 2.9 MMOL/L (ref 3.6–5)
PROT SERPL-MCNC: 5 GM/DL (ref 6.4–8.2)
SODIUM SERPL-SCNC: 133 MMOL/L (ref 135–145)
WBC # BLD AUTO: 6.2 10^3/UL (ref 4.3–11)

## 2020-11-28 RX ADMIN — PANTOPRAZOLE SODIUM SCH MG: 40 INJECTION, POWDER, FOR SOLUTION INTRAVENOUS at 16:48

## 2020-11-28 RX ADMIN — IBUPROFEN PRN MG: 600 TABLET ORAL at 13:52

## 2020-11-28 RX ADMIN — PANTOPRAZOLE SODIUM SCH MG: 40 INJECTION, POWDER, FOR SOLUTION INTRAVENOUS at 06:14

## 2020-11-28 RX ADMIN — SODIUM CHLORIDE SCH MLS/HR: 900 INJECTION, SOLUTION INTRAVENOUS at 21:29

## 2020-11-28 RX ADMIN — SODIUM CHLORIDE SCH MLS/HR: 900 INJECTION, SOLUTION INTRAVENOUS at 04:41

## 2020-11-28 RX ADMIN — SODIUM CHLORIDE SCH MLS/HR: 900 INJECTION, SOLUTION INTRAVENOUS at 13:51

## 2020-11-28 RX ADMIN — IBUPROFEN PRN MG: 600 TABLET ORAL at 00:05

## 2020-11-28 NOTE — PROGRESS NOTE - HOSPITALIST
Subjective


HPI/CC On Admission


Date Seen by Provider:  2020


Time Seen by Provider:  11:30


CC: Hypoxia with weakness





HPI: This is a 55 yoWF clinic patient of [facility] who presented to the ER with 

hypoxia and weakness. COVID swab taken and pending at time of exam. GIB 

suspected to Dr Ramirez consulted. Repeat labs in morning revealed hgb 6.6 so 

transfused 2 units of blood. Black stools reported per patient. O2 maintained.


Subjective/Events-last exam


Hgb better at 9.5


Platelet count 50k


IVF maintained


Moving around to the bathroom


Dr Ramirez on board


PPI





Review of Systems


General:  Fatigue, Malaise





Focused Exam


Lactate Level


20 16:10: Lactic Acid Level 5.98*H


20 18:30: Lactic Acid Level 1.69








Objective


Exam


Vital Signs





Vital Signs








  Date Time  Temp Pulse Resp B/P (MAP) Pulse Ox O2 Delivery O2 Flow Rate FiO2


 


20 12:00 36.0 74 18 130/60 (83) 100 Room Air  





Capillary Refill : Less Than 3 Seconds


General Appearance:  No Apparent Distress, WD/WN, Chronically ill, Thin


Respiratory:  Lungs Clear, Normal Breath Sounds


Cardiovascular:  Regular Rate, Rhythm


Neurologic/Psychiatric:  Alert, Oriented x3, No Motor/Sensory Deficits, Normal 

Mood/Affect





Results/Procedures


Lab


Laboratory Tests


20 06:58








Patient resulted labs reviewed.





Assessment/Plan


Assessment and Plan


Assess & Plan/Chief Complaint


Assessment:


GIB


Anemia requiring 2 units of blood


Cirrhosis





Plan:


Transfused


PPI


Dr Ramirez consult





Diagnosis/Problems


Diagnosis/Problems





(1) Upper GI bleed


(2) UTI (urinary tract infection)


Status:  Acute


(3) Sepsis


(4) Cirrhosis


(5) Anemia


(6) Rectal bleeding





Clinical Quality Measures


DVT/VTE Risk/Contraindication:


Risk Factor Score Per Nursin


RFS Level Per Nursing on Admit:  4+=Very High











CHASE LEWIS DO                2020 12:58

## 2020-11-28 NOTE — PROGRESS NOTE - SURGERY
MAHESH BURGOS MED STUDENT 20 0936:


Subjective


Date Seen by a Provider:  2020


Time Seen by a Provider:  09:05


Subjective/Events-last exam


Pt states she is feeling much better today. Has more energy, no N/V, abdominal 

pain, or SOB. Notes she is still having frequent BM's that look like "chunks of 

tar". Has been tolerating clear liquid diet. COVID test came back negative. Had 

2 units transfused yesterday and hgb is 9.5 today from 6.6 yesterday. WBC is 

6.2.





Focused Exam


Lactate Level


20 16:10: Lactic Acid Level 5.98*H


20 18:30: Lactic Acid Level 1.69





Objective


Exam





Vital Signs








  Date Time  Temp Pulse Resp B/P (MAP) Pulse Ox O2 Delivery O2 Flow Rate FiO2


 


20 04:00 36.5 77 18 117/66 (83) 100 Room Air  


 


20 00:00 36.4 87 18 121/65 (83) 99 Room Air  


 


20 20:31 36.8 92 18 124/80 (95) 99 Room Air  


 


20 20:25      Room Air  


 


20 16:16 36.8 95 18 122/73 (89) 98 Room Air  


 


20 15:22 36.3 92 18 102/65 100 Room Air  


 


20 12:19 36.7 102 16 103/66 100 Room Air  


 


20 12:03 36.6 94 16 101/63 99 Room Air  


 


20 12:01 36.6 94 16 101/63 99   


 


20 12:00 36.9 92 16 93/58 (70) 99 Room Air  


 


20 09:38 36.9 92 16 93/58 99 Room Air  














I & O 


 


 20





 07:00


 


Intake Total 3330 ml


 


Output Total 200 ml


 


Balance 3130 ml





Capillary Refill : Less Than 3 Seconds


General Appearance:  No Apparent Distress, Thin


HEENT:  PERRL/EOMI, Normal ENT Inspection


Neck:  Normal Inspection, Supple


Respiratory:  Chest Non Tender, Lungs Clear, Normal Breath Sounds, No Accessory 

Muscle Use, No Respiratory Distress


Cardiovascular:  Regular Rate, Rhythm, No Edema, No Murmur


Gastrointestinal:  non tender, soft; No distended, No guarding, No rebound; 

hernia (Umbilical)


Extremity:  Normal Inspection, No Calf Tenderness, No Pedal Edema


Neurologic/Psychiatric:  Alert, Oriented x3, No Motor/Sensory Deficits, Normal 

Mood/Affect


Skin:  Normal Color, Warm/Dry


Lymphatic:  No Adenopathy





Results


Lab


Laboratory Tests


20 16:00: Stool Occult Blood Immunoassay POSITIVEH


20 06:58: 


White Blood Count 6.2, Red Blood Count 3.03L, Hemoglobin 9.5#L, Hematocrit 29L, 

Mean Corpuscular Volume 95, Mean Corpuscular Hemoglobin 31, Mean Corpuscular 

Hemoglobin Concent 33, Red Cell Distribution Width 18.8H, Platelet Count 50L, 

Mean Platelet Volume 11.6, Sodium Level 133L, Potassium Level 2.9L, Chloride 

Level 108H, Carbon Dioxide Level 16L, Anion Gap 9, Blood Urea Nitrogen 12, 

Creatinine 0.53L, Estimat Glomerular Filtration Rate > 60, BUN/Creatinine Ratio 

23, Glucose Level 93, Calcium Level 6.9L, Corrected Calcium 7.9L, Total Bilirubi

n 1.0, Aspartate Amino Transf (AST/SGOT) 42H, Alanine Aminotransferase 

(ALT/SGPT) 32, Alkaline Phosphatase 64, Total Protein 5.0L, Albumin 2.7L





Assessment/Plan


Anemia


Cholelithiasis


Melena


Cirrhosis


UTI


Nausea/vomiting/diarrhea


Shortness of breath








Pt having dark stools so I suspect upper GI bleed


Protonix


Clear liquids


Follow labs and transfuse PRBC as needed.


Antibiotics for UTI 


COVID-19 negative


No surgical intervention at this time





Clinical Quality Measures


DVT/VTE Risk/Contraindication:


Risk Factor Score Per Nursin


RFS Level Per Nursing on Admit:  4+=Very High





TIFFANIE RAMIREZ DO 20 1225:


Subjective


Subjective/Events-last exam


Still with black stools. Hgb responded appropriately to transfusion. No 

abdominal pain.   Denies n/v fever sweats chills shortness of breath or chest 

pain.


On clear diet.





Objective


Exam


General Appearance:  No Apparent Distress


HEENT:  PERRL/EOMI, Normal ENT Inspection


Neck:  Normal Inspection, Supple


Respiratory:  Chest Non Tender, No Accessory Muscle Use, No Respiratory Distress


Cardiovascular:  Regular Rate, Rhythm, No JVD


Gastrointestinal:  non tender, soft; No distended, No guarding, No rebound; 

hernia (Umbilical)


Extremity:  Normal Inspection, No Calf Tenderness


Neurologic/Psychiatric:  Alert, Oriented x3, No Motor/Sensory Deficits, Normal 

Mood/Affect


Skin:  Normal Color, Warm/Dry


Lymphatic:  No Adenopathy





Assessment/Plan


Anemia


Cholelithiasis


Melena


Cirrhosis


UTI


Nausea/vomiting/diarrhea-improved


Shortness of breath-improved








Pt having dark stools so I suspect upper GI bleed


Protonix


Clear liquids


Follow labs and transfuse PRBC as needed.


Antibiotics for UTI 


COVID-19 negative


No surgical intervention at this time





Supervisory-Addendum Brief


Verification & Attestation


Participated in pt care:  history, MDM, physical


Personally performed:  exam, history, MDM, supervision of care


Care discussed with:  Medical Student


Procedures:  n/a


Results interpretation:  Verified all documentation


Verification and Attestation of Medical Student E/M Service





A medical student performed and documented this service in my presence. I 

reviewed and verified all information documented by the medical student and made

modifications to such information, when appropriate. I personally performed the 

physical exam and medical decision making. 





 Tiffanie Ramirez, 2020,12:25











MAHESH BURGOS MED STUDENT     2020 09:36


TIFFANIE RAMIREZ DO              2020 12:25

## 2020-11-29 VITALS — DIASTOLIC BLOOD PRESSURE: 68 MMHG | SYSTOLIC BLOOD PRESSURE: 128 MMHG

## 2020-11-29 VITALS — SYSTOLIC BLOOD PRESSURE: 123 MMHG | DIASTOLIC BLOOD PRESSURE: 68 MMHG

## 2020-11-29 VITALS — DIASTOLIC BLOOD PRESSURE: 80 MMHG | SYSTOLIC BLOOD PRESSURE: 130 MMHG

## 2020-11-29 VITALS — SYSTOLIC BLOOD PRESSURE: 154 MMHG | DIASTOLIC BLOOD PRESSURE: 72 MMHG

## 2020-11-29 LAB
ALBUMIN SERPL-MCNC: 2.7 GM/DL (ref 3.2–4.5)
ALP SERPL-CCNC: 75 U/L (ref 40–136)
ALT SERPL-CCNC: 35 U/L (ref 0–55)
BILIRUB SERPL-MCNC: 0.8 MG/DL (ref 0.1–1)
BUN/CREAT SERPL: 10
CALCIUM SERPL-MCNC: 6.8 MG/DL (ref 8.5–10.1)
CHLORIDE SERPL-SCNC: 110 MMOL/L (ref 98–107)
CO2 SERPL-SCNC: 16 MMOL/L (ref 21–32)
CREAT SERPL-MCNC: 0.49 MG/DL (ref 0.6–1.3)
GFR SERPLBLD BASED ON 1.73 SQ M-ARVRAT: > 60 ML/MIN
GLUCOSE SERPL-MCNC: 93 MG/DL (ref 70–105)
HCT VFR BLD CALC: 28 % (ref 35–52)
HGB BLD-MCNC: 9.4 G/DL (ref 11.5–16)
MCH RBC QN AUTO: 32 PG (ref 25–34)
MCHC RBC AUTO-ENTMCNC: 34 G/DL (ref 32–36)
MCV RBC AUTO: 94 FL (ref 80–99)
PLATELET # BLD: 50 10^3/UL (ref 130–400)
PMV BLD AUTO: 11.6 FL (ref 9–12.2)
POTASSIUM SERPL-SCNC: 2.6 MMOL/L (ref 3.6–5)
PROT SERPL-MCNC: 4.8 GM/DL (ref 6.4–8.2)
SODIUM SERPL-SCNC: 136 MMOL/L (ref 135–145)
WBC # BLD AUTO: 5.2 10^3/UL (ref 4.3–11)

## 2020-11-29 RX ADMIN — SODIUM CHLORIDE SCH MLS/HR: 900 INJECTION, SOLUTION INTRAVENOUS at 07:15

## 2020-11-29 RX ADMIN — PANTOPRAZOLE SODIUM SCH MG: 40 INJECTION, POWDER, FOR SOLUTION INTRAVENOUS at 06:13

## 2020-11-29 RX ADMIN — SODIUM CHLORIDE SCH MLS/HR: 900 INJECTION, SOLUTION INTRAVENOUS at 06:07

## 2020-11-29 RX ADMIN — SODIUM CHLORIDE SCH MLS/HR: 900 INJECTION, SOLUTION INTRAVENOUS at 01:15

## 2020-11-29 RX ADMIN — SODIUM CHLORIDE SCH MLS/HR: 900 INJECTION, SOLUTION INTRAVENOUS at 13:43

## 2020-11-29 RX ADMIN — SODIUM CHLORIDE SCH MLS/HR: 900 INJECTION, SOLUTION INTRAVENOUS at 13:42

## 2020-11-29 RX ADMIN — IBUPROFEN PRN MG: 600 TABLET ORAL at 09:23

## 2020-11-29 NOTE — DISCHARGE SUMMARY
Discharge Summary


Hospital Course


Was the Problem List Reviewed?:  Yes


Problems/Dx:  


(1) Upper GI bleed


(2) UTI (urinary tract infection)


Status:  Acute


(3) Sepsis


(4) Cirrhosis


(5) Anemia


(6) Rectal bleeding


Hospital Course


Date of Admission: 2020 at 19:10 


Admission Diagnosis :  





Family Physician/Provider: [facility]  





Date of Discharge: 20 


Discharge Diagnosis: GIB, Cirrhosis, abnormal UA








Hospital Course:


Short course after admitted with UTI and melena and anemia. UCx revealed normal 

marie so no abx given at DC. Required 2 units of blood. Dr Ramirez consulted and 

will need EGD as outpatient and patient will be on PPI at time of DC BID and 

replacing potassium and restart all her outpatient meds














Labs and Pending Lab Test:


Laboratory Tests


20 07:08: 


White Blood Count 5.2, Red Blood Count 2.94L, Hemoglobin 9.4L, Hematocrit 28L, 

Mean Corpuscular Volume 94, Mean Corpuscular Hemoglobin 32, Mean Corpuscular 

Hemoglobin Concent 34, Red Cell Distribution Width 18.5H, Platelet Count 50L, 

Mean Platelet Volume 11.6, Sodium Level 136, Potassium Level 2.6L, Chloride 

Level 110H, Carbon Dioxide Level 16L, Anion Gap 10, Blood Urea Nitrogen 5L, 

Creatinine 0.49L, Estimat Glomerular Filtration Rate > 60, BUN/Creatinine Ratio 

10, Glucose Level 93, Calcium Level 6.8L, Corrected Calcium 7.8L, Total 

Bilirubin 0.8, Aspartate Amino Transf (AST/SGOT) 44H, Alanine Aminotransferase 

(ALT/SGPT) 35, Alkaline Phosphatase 75, Total Protein 4.8L, Albumin 2.7L





Microbiology


20 Urine Culture - Final, Complete


           3 or more isolates





Home Meds


Active


Reported


Vitamin D2 (Ergocalciferol (Vitamin D2)) 1,250 Mcg Capsule 1,250 Mcg PO FRI


Paroxetine HCl 10 Mg Tablet 10 Mg PO DAILY


Heartburn Relief (Famotidine) 20 Mg Tablet 20 Mg PO DAILY


Hydroxyzine HCl 25 Mg Tablet 25 Mg PO Q8H PRN


Aldactone (Spironolactone) 100 Mg Tablet 100 Mg PO DAILY


Sucralfate 1 Gm Tablet 1 Gm PO QID PRN


Furosemide 40 Mg Tablet 40 Mg PO DAILY


     LAST FILLED 2020 #30/30 DAY SUPPLY


Pantoprazole Sodium 40 Mg Tablet. 40 Mg PO DAILY


Assessment/Pt Instructions


CHC 1 week


Discharge Planning:  <30 minutes discharge planning





Discharge Physical Examination


Vital Signs





Vital Signs








  Date Time  Temp Pulse Resp B/P (MAP) Pulse Ox O2 Delivery O2 Flow Rate FiO2


 


20 12:00 36.4 90 20 154/72 (99) 98 Room Air  








General Appearance:  No Apparent Distress, WD/WN, Chronically ill


Respiratory:  Lungs Clear


Cardiovascular:  Regular Rate, Rhythm


Allergies:  


Coded Allergies:  


     Sulfa (Sulfonamide Antibiotics) (Verified  Allergy, Unknown, 20)





Discharge Summary


Date of Admission


2020 at 19:10


Date of Discharge





Discharge Date:  2020


Admission Diagnosis


Assessment:


GIB


Sepsis


UTI


Cirrhosis


Weakness


Anemia





Plan:


Monitor hgb


Transfuse


PPI


Dr Ramirez consultation


Discharge Diagnosis


Assessment:


GIB


Anemia requiring 2 units of blood


Cirrhosis





Plan:


Transfused


PPI


Dr Ramirez consult





(1) Upper GI bleed


(2) UTI (urinary tract infection)


Status:  Acute


(3) Sepsis


(4) Cirrhosis


(5) Anemia


(6) Rectal bleeding





Clinical Quality Measures


DVT/VTE Risk/Contraindication:


Risk Factor Score Per Nursin


RFS Level Per Nursing on Admit:  4+=Very High











CHASE LEWIS DO                2020 12:25

## 2020-11-29 NOTE — PROGRESS NOTE - HOSPITALIST
Subjective


HPI/CC On Admission


Date Seen by Provider:  2020


CC: Hypoxia with weakness





HPI: This is a 55 yoWF clinic patient of [facility] who presented to the ER with 

hypoxia and weakness. COVID swab taken and pending at time of exam. GIB suspe

cted to Dr Ramirez consulted. Repeat labs in morning revealed hgb 6.6 so 

transfused 2 units of blood. Black stools reported per patient. O2 maintained.





Focused Exam


Lactate Level


20 16:10: Lactic Acid Level 5.98*H


20 18:30: Lactic Acid Level 1.69








Objective


Exam


Vital Signs





Vital Signs








  Date Time  Temp Pulse Resp B/P (MAP) Pulse Ox O2 Delivery O2 Flow Rate FiO2


 


20 12:00 36.4 90 20 154/72 (99) 98 Room Air  





Capillary Refill : Less Than 3 Seconds





Results/Procedures


Lab


Laboratory Tests


20 07:08








Patient resulted labs reviewed.





Assessment/Plan


Assessment and Plan


Assess & Plan/Chief Complaint


Assessment:


GIB


Anemia requiring 2 units of blood


Cirrhosis





Plan:


Transfused


PPI


Dr Ramirez consult





Diagnosis/Problems


Diagnosis/Problems





(1) Upper GI bleed


(2) UTI (urinary tract infection)


Status:  Acute


(3) Sepsis


(4) Cirrhosis


(5) Anemia


(6) Rectal bleeding





Clinical Quality Measures


DVT/VTE Risk/Contraindication:


Risk Factor Score Per Nursin


RFS Level Per Nursing on Admit:  4+=Very High











CHASE LEWIS DO                2020 07:26

## 2020-11-29 NOTE — NUR
CODI STEVENS demonstrates understanding of discharge instructions and accurately returns 
instructions upon questioning.  Copy of Post-Discharge Instructions and Medication Discharge 
Instructions given to PTCODI. CODI STEVENS is/is not able to manage continuing needs 
after discharge.  Patients belongings returned to CODI STEVENS. Skin dry and intact; no 
breakdown noted. Patient discharged from [facility] on 11/29/20 at 1435. CODI STEVENS left floor 
via WC, accompanied by STAFF.

## 2020-11-29 NOTE — PROGRESS NOTE - SURGERY
MAHESH BURGOS MED STUDENT 20 1018:


Subjective


Date Seen by a Provider:  2020


Time Seen by a Provider:  09:00


Subjective/Events-last exam


Pt states she feels much improved and wants to go home. States her stools are 

still dark, but not as dark as yesterday. Denies N/V, abdominal pain, CP, or 

SOB. Doing well with clears and wants her diet to be advanced. Hgb today is 9.4 

and WBC is 5.2. Potassium 2.6.





Focused Exam


Lactate Level


20 16:10: Lactic Acid Level 5.98*H


20 18:30: Lactic Acid Level 1.69





Objective


Exam





Vital Signs








  Date Time  Temp Pulse Resp B/P (MAP) Pulse Ox O2 Delivery O2 Flow Rate FiO2


 


20 08:00      Room Air  


 


20 08:00 36.5 89 20 128/68 (88) 100 Room Air  


 


20 04:00 36.4 84 20 130/80 (97) 100 Room Air  


 


20 00:36 36.0 87 20 123/68 (86) 99 Room Air  


 


20 20:00      Room Air  


 


20 19:31 36.7 81 18 148/72 (97) 100 Room Air  


 


20 16:19 36.1 74 18 132/73 (92) 100 Room Air  


 


20 12:00 36.0 74 18 130/60 (83) 100 Room Air  














I & O 


 


 20





 07:00


 


Intake Total 4415 ml


 


Balance 4415 ml





Capillary Refill : Less Than 3 Seconds


General Appearance:  No Apparent Distress, WD/WN, Thin


HEENT:  PERRL/EOMI, Normal ENT Inspection


Neck:  Normal Inspection, Supple


Respiratory:  Lungs Clear, Normal Breath Sounds, No Accessory Muscle Use, No 

Respiratory Distress


Cardiovascular:  Regular Rate, Rhythm, No Edema, No Murmur


Gastrointestinal:  non tender, soft; No distended, No guarding, No rebound; 

hernia (umbilical, soft and reducible)


Extremity:  Normal Inspection, No Calf Tenderness, No Pedal Edema


Neurologic/Psychiatric:  Alert, Oriented x3, No Motor/Sensory Deficits, Normal 

Mood/Affect


Skin:  Normal Color, Warm/Dry


Lymphatic:  No Adenopathy





Results


Lab


Laboratory Tests


20 07:08: 


White Blood Count 5.2, Red Blood Count 2.94L, Hemoglobin 9.4L, Hematocrit 28L, 

Mean Corpuscular Volume 94, Mean Corpuscular Hemoglobin 32, Mean Corpuscular 

Hemoglobin Concent 34, Red Cell Distribution Width 18.5H, Platelet Count 50L, 

Mean Platelet Volume 11.6, Sodium Level 136, Potassium Level 2.6L, Chloride 

Level 110H, Carbon Dioxide Level 16L, Anion Gap 10, Blood Urea Nitrogen 5L, 

Creatinine 0.49L, Estimat Glomerular Filtration Rate > 60, BUN/Creatinine Ratio 

10, Glucose Level 93, Calcium Level 6.8L, Corrected Calcium 7.8L, Total 

Bilirubin 0.8, Aspartate Amino Transf (AST/SGOT) 44H, Alanine Aminotransferase 

(ALT/SGPT) 35, Alkaline Phosphatase 75, Total Protein 4.8L, Albumin 2.7L





Microbiology


20 Urine Culture - Final, Complete


           3 or more isolates





Assessment/Plan


Anemia


Cholelithiasis


Melena


Cirrhosis


UTI


Nausea/vomiting/diarrhea-improved


Shortness of breath-improved


umbilical hernia


hypokalemia








Pt having dark stools so I suspect upper GI bleed


Protonix


Tolerating clear liquids


COVID-19 negative


No surgical intervention at this time, stable for discharge from a surgical 

standpoint


Pt to follow up with Dr. Howe in clinic since he did her colonoscopy





Clinical Quality Measures


DVT/VTE Risk/Contraindication:


Risk Factor Score Per Nursin


RFS Level Per Nursing on Admit:  4+=Very High





OVALLETIFFANIE ENCARNACION DO 20 1522:


Subjective


Subjective/Events-last exam


Patient feeling well and wanting to go home.  She is not having abdominal pain. 

Her hemoglobin is stable.  She denies any nausea vomiting fever sweats chills 

shortness of breath or chest pain.





Objective


Exam


General Appearance:  No Apparent Distress, WD/WN


HEENT:  PERRL/EOMI


Neck:  Normal Inspection, Supple


Respiratory:  Chest Non Tender, No Accessory Muscle Use, No Respiratory Distress


Cardiovascular:  Regular Rate, Rhythm, No Edema, No Murmur


Gastrointestinal:  non tender, soft; No distended, No guarding, No rebound; 

hernia (umbilical, soft and reducible)


Extremity:  Normal Inspection


Neurologic/Psychiatric:  Alert, Oriented x3, No Motor/Sensory Deficits, Normal 

Mood/Affect


Skin:  Normal Color, Warm/Dry


Lymphatic:  No Adenopathy





Assessment/Plan


Anemia-stable suspect upper gi bleed


Cholelithiasis


Melena


Cirrhosis


UTI


Nausea/vomiting/diarrhea-improved


Shortness of breath-improved


umbilical hernia


hypokalemia





stable for dc home, will have follow up with Dr. Howe who did recent Endos

copies.


Keep on protonix





Supervisory-Addendum Brief


Verification & Attestation


Participated in pt care:  history, MDM, physical


Personally performed:  exam, history, MDM, supervision of care


Care discussed with:  Medical Student


Procedures:  n/a


Results interpretation:  Verified all documentation


Verification and Attestation of Medical Student E/M Service





A medical student performed and documented this service in my presence. I 

reviewed and verified all information documented by the medical student and made

modifications to such information, when appropriate. I personally performed the 

physical exam and medical decision making. 





 Tiffanie Ovalle, 2020,15:24











MAHESH BURGOS MED STUDENT     2020 10:18


TIFFANIE OVALLE DO              2020 15:22

## 2020-11-30 NOTE — PHYSICIAN QUERY CLARIFICATION
PQ-Further Specificity


Admission/Discharge


Admission Date: Nov 26, 2020 at 19:10 


Discharge Date:  Nov 29, 2020 at 14:35


 Dr. Solorzano,





The medical record reflects the following clinical scenario:





History/Risk Factors:


GIB, cirrhosis, anemia, HTN





Clinical Findings:


T 36.1, P 111, R 24, WBC 19.7, Lactic 5.98





Treatment:


IV Ceftriaxone





Question:  Can you further specify whether or not patient had sepsis after study

per the clinical indicators above? 


Please document a response in the Progress Notes or Discharge Summary.





1. No sepsis after study





2. Yes, patient presented with sepsis





3. Other, with explanation of the clinical findings.





4. Clinically undetermined, no explanation for the clinical findings.





PHYSICIAN RESPONSE


Can you specify per above:  1


Please remember a lack of response to the above will prompt a phone page by 

CDI/Coding staff. 





In responding to this query, please exercise your independent professional 

judgment.  The purpose of this communication is to more accurately reflect the 

complexity of your patients condition. The fact that a question is asked does 

not imply that any particular answer is desired or expected.  





Thank you for your timely response to this clarification.      


   


Requestors name: Maddie   





Phone # [phone]








THIS PHYSICIAN QUERY FORM IS A PERMANENT PART OF THE MEDICAL RECORD











MADDIE XIE                 Nov 30, 2020 08:40


CHASE SOLORZANO DO                Nov 30, 2020 10:34

## 2020-11-30 NOTE — PHYSICIAN QUERY CLARIFICATION
PQ-Further Specificity


Admission/Discharge


Admission Date: Nov 26, 2020 at 19:10 


Discharge Date:  Nov 29, 2020 at 14:35


Dr. Solorzano,





The medical record reflects the following clinical scenario:





History/Risk Factors:


GIB, anemia, HTN, cirrhosis





Clinical Findings:


Hgb/Hct 6.6/21





Treatment:


transfused 2U PRBC"s





Question:  Can you further specify anemia per the clinical indicators above? 


Please document a response in the Progress Notes or Discharge Summary.





1. anemia d/t acute blood loss





2. anemia d/t chronic blood loss





3. Other, with explanation of the clinical findings.





4. Clinically undetermined, no explanation for the clinical findings.





PHYSICIAN RESPONSE


Can you specify per above:  2


Please remember a lack of response to the above will prompt a phone page by 

CDI/Coding staff. 





In responding to this query, please exercise your independent professional 

judgment.  The purpose of this communication is to more accurately reflect the 

complexity of your patients condition. The fact that a question is asked does 

not imply that any particular answer is desired or expected.  





Thank you for your timely response to this clarification.      


   


Requestors name: Maddie   





Phone # [phone]








THIS PHYSICIAN QUERY FORM IS A PERMANENT PART OF THE MEDICAL RECORD











MADDIE XIE                 Nov 30, 2020 08:51


CHASE SOLORZANO DO                Nov 30, 2020 10:34

## 2021-02-08 ENCOUNTER — HOSPITAL ENCOUNTER (OUTPATIENT)
Dept: HOSPITAL 75 - RAD | Age: 56
End: 2021-02-08
Attending: INTERNAL MEDICINE
Payer: COMMERCIAL

## 2021-02-08 DIAGNOSIS — K70.30: Primary | ICD-10-CM

## 2021-02-08 DIAGNOSIS — K80.20: ICD-10-CM

## 2021-02-08 PROCEDURE — 76700 US EXAM ABDOM COMPLETE: CPT

## 2021-02-08 NOTE — DIAGNOSTIC IMAGING REPORT
INDICATION: 

Alcoholic cirrhosis.



PROCEDURE: 

Ultrasound abdomen complete.



TECHNIQUE: 

Multiple Real-time grayscale images were obtained of the abdomen

in various projections.



FINDINGS:

The liver demonstrates a markedly heterogeneous parenchymal

pattern. There is a nodular contour and findings are consistent

with cirrhosis. No discrete liver mass is identified. There

appear to be multiple varices adjacent to the left lobe of the

liver, perhaps owing to recanalization of the umbilical vein.

There is dilatation of the portal vein but the portal vein does

appear to be patent and shows normal direction of flow. The

gallbladder does contain small stones. There is no wall

thickening or biliary ductal dilatation identified. The pancreas 

is unremarkable. The spleen is normal in size at 13.7 cm. The

aorta is of normal caliber. The IVC is patent. The kidneys are

without calculi or hydronephrosis. There is no ascites.



IMPRESSION: 

1. Findings suggestive of cirrhosis and portal hypertension. No

discrete liver mass is detected.

2. Cholelithiasis without evidence of acute cholecystitis.



Dictated by: 



  Dictated on workstation # [id number]

## 2021-03-10 ENCOUNTER — HOSPITAL ENCOUNTER (EMERGENCY)
Dept: HOSPITAL 75 - ER | Age: 56
Discharge: HOME | End: 2021-03-10
Payer: COMMERCIAL

## 2021-03-10 VITALS — BODY MASS INDEX: 16.87 KG/M2 | HEIGHT: 66.02 IN | WEIGHT: 104.94 LBS

## 2021-03-10 VITALS — SYSTOLIC BLOOD PRESSURE: 100 MMHG | DIASTOLIC BLOOD PRESSURE: 66 MMHG
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
## 2021-03-10 DIAGNOSIS — K21.9: ICD-10-CM

## 2021-03-10 DIAGNOSIS — Z88.2: ICD-10-CM

## 2021-03-10 DIAGNOSIS — S42.031A: Primary | ICD-10-CM

## 2021-03-10 PROCEDURE — 99283 EMERGENCY DEPT VISIT LOW MDM: CPT

## 2021-03-10 PROCEDURE — 73030 X-RAY EXAM OF SHOULDER: CPT

## 2021-03-10 NOTE — ED UPPER EXTREMITY
General


Chief Complaint:  Upper Extremity


Stated Complaint:  R SHOULDER PAIN


Nursing Triage Note:  


Pt ambulatory to ED.  Pt reports getting pulled to ground when walking dog 


yesterday.  Pt reports falling on R shoulder.  Pt reports R should pain.  There 


is visible bruising.  Pt has limited ROM.  Pt reports previous break in same 


arm.


Nursing Sepsis Screen:  No Definite Risk


Source:  patient


Exam Limitations:  no limitations





History of Present Illness


Date Seen by Provider:  Mar 10, 2021


Time Seen by Provider:  09:20


Initial Comments


The patient presents to the ER by private conveyance from home with chief 

complaint that yesterday while at the dog park she had her dog leash and he got 

excited about some other dogs and pulled her forward onto her right shoulder.  

She did not land on outstretched hand.  She is not having any pain in her wrist 

or elbow and has full range of motion there but has limited range of motion of 

abduction of her right shoulder secondary to pain.  She took a tramadol that she

had because she had a tooth pulled this week and was able to sleep okay but when

she woke up she was still quite sore and stiff in her right shoulder.  She has a

history of fracture in the right shoulder but no surgery.  She is not on blood 

thinners and did not strike her head nor did she lose consciousness





Allergies and Home Medications


Allergies


Coded Allergies:  


     Sulfa (Sulfonamide Antibiotics) (Verified  Allergy, Unknown, 20)





Home Medications


Ergocalciferol (Vitamin D2) 1,250 Mcg Capsule, 1,250 MCG PO FRI, (Reported)


Famotidine 20 Mg Tablet, 20 MG PO DAILY, (Reported)


Furosemide 40 Mg Tablet, 40 MG PO DAILY, (Reported)


   LAST FILLED 2020 #30/30 DAY SUPPLY 


Hydroxyzine HCl 25 Mg Tablet, 25 MG PO Q8H PRN for ANXIETY, (Reported)


Pantoprazole Sodium 40 Mg Tablet.dr, 40 MG PO BID


   Prescribed by: CHASE LEWIS on 20 122


Paroxetine HCl 10 Mg Tablet, 10 MG PO DAILY, (Reported)


Potassium Chloride 10 Meq Tab.er.prt, 10 MEQ PO BID


   Prescribed by: CHASE LEWIS on 20 1225


Spironolactone 100 Mg Tablet, 100 MG PO DAILY, (Reported)


Sucralfate 1 Gm Tablet, 1 GM PO QID PRN for ULCERS, (Reported)





Patient Home Medication List


Home Medication List Reviewed:  Yes





Review of Systems


Constitutional:  No chills, No diaphoresis


EENTM:  No ear discharge, No ear pain


Respiratory:  No cough, No short of breath


Cardiovascular:  No edema, No palpitations


Gastrointestinal:  No abdominal pain, No nausea


Genitourinary:  No discharge, No dysuria


Pregnant:  No


Musculoskeletal:  see HPI; No back pain; joint pain





Past Medical-Social-Family Hx


Patient Social History


Alcohol Use:  Denies Use


2nd Hand Smoke Exposure:  No


Recent Infectious Disease Expo:  No


Recent Hopitalizations:  No





Immunizations Up To Date


Tetanus Booster (TDap):  Unknown


Date of Influenza Vaccine:  Oct 22, 2020





Seasonal Allergies


Seasonal Allergies:  Yes





Past Medical History


Surgeries:  Yes


Appendectomy, Oophorectomy


Respiratory:  No


Pneumonia


Cardiac:  Yes


Hypertension


Neurological:  No


Reproductive Disorders:  No


Genitourinary:  No


Gastrointestinal:  Yes


Gastroesophageal Reflux, Cirrhosis


Musculoskeletal:  No


Endocrine:  No


HEENT:  No


Cancer:  No


Psychosocial:  No


Integumentary:  No


Blood Disorders:  Yes (low iron )


Adverse Reaction/Blood Tranf:  No





Family Medical History





Patient reports no known family medical history.


No Pertinent Family Hx





Physical Exam


Vital Signs





Vital Signs - First Documented








 3/10/21





 09:18


 


Temp 35.7


 


Pulse 97


 


Resp 20


 


B/P (MAP) 100/66 (77)


 


Pulse Ox 100


 


O2 Delivery Room Air





Capillary Refill : Less Than 3 Seconds


Height, Weight, BMI


Height: '"


Weight: lbs. oz. kg; 16.00 BMI


Method:


General Appearance:  WD/WN, mild distress


HEENT:  PERRL/EOMI, pharynx normal


Neck:  non-tender, full range of motion, supple, normal inspection


Cardiovascular:  normal peripheral pulses, regular rate, rhythm


Respiratory:  no respiratory distress, no accessory muscle use


Back:  normal inspection, no vertebral tenderness


Shoulder:  bone tenderness (Anterior glenohumeral joint and acromioclavicular 

joint right side); No deformity; ecchymosis (Right shoulder), limited ROM 

(Limited abduction to about 20 degrees secondary to pain in the right shoulder),

pain, swelling


Elbow/Forearm:  normal inspection, non-tender, no evidence of injury, normal 

ROM, Right


Wrist:  Yes normal inspection, Yes non-tender, Yes no evidence of injury, Yes 

normal ROM


Hand:  normal inspection, non-tender, no evidence of injury, normal ROM, Right


Neurologic/Tendon:  normal sensation, normal motor functions, normal tendon 

functions, responds to pain, no evidence tendon injury


Neurologic/Psychiatric:  alert, normal mood/affect, oriented x 3


Skin:  ecchymosis





Progress/Results/Core Measures


Results/Orders


My Orders





Orders - JEROMY RUTLEDGE


Shoulder, Right, 3 Views (3/10/21 09:33)





Vital Signs/I&O











 3/10/21





 09:18


 


Temp 35.7


 


Pulse 97


 


Resp 20


 


B/P (MAP) 100/66 (77)


 


Pulse Ox 100


 


O2 Delivery Room Air














Blood Pressure Mean:                    77











Progress


Progress Note :  


   Time:  09:45


Progress Note


Ice pack and plain films of the right shoulder.  She brought her tramadol and 

said she would take it if she needed it.





Diagnostic Imaging





   Diagonstic Imaging:  Xray


   Plain Films/CT/US/NM/MRI:  other (Right shoulder)


Comments


NAME:   CODI STEVENS


MED REC#:   [id number]


ACCOUNT#:   [id number]


PT STATUS:   REG ER


:   1965


PHYSICIAN:   JEROMY RUTLEDGE MD


ADMIT DATE:   03/10/21/ER


                                   ***Draft***


Date of Exam:03/10/21





SHOULDER, RIGHT, 3 VIEWS








INDICATION:  


One day post fall, landing on right shoulder. History of fracture


with pain in the posterior right shoulder with limited range of


motion.





TECHNIQUE: 


Four views of the right shoulder.





CORRELATION STUDY:  


None.





FINDINGS: 


There is a fracture involving the distal clavicle. The distal


component is displaced superiorly approximately half the width of


the bone. Additionally, there is abnormal widening at the


acromioclavicular joint.  There is deformity about the humeral


head and neck, most pronounced along its medial aspect. A


definitive acute fracture line is not visualized and this may


reflect a more remote or previous injury.





IMPRESSION: 


1.  Mildly displaced fracture of the distal right clavicle. There


is questioned sclerosis; however, the age of this is somewhat


indeterminate. There is also abnormal widening at the


acromioclavicular joint.





2. Deformity about the humeral head and neck with age somewhat


indeterminate but favors a probable more remote fracture.





3. If further evaluation is desired, CT imaging would likely be


of additional diagnostic utility.





  Dictated on workstation # [id number]








Dict:   03/10/21 1001


Trans:   03/10/21 1006


 [id number]





Interpreted by:     BILL CEBALLOS DO


Electronically signed by:


   Reviewed:  Reviewed by Me





Departure


Impression





   Primary Impression:  


   Closed right clavicular fracture


   Qualified Codes:  S42.031A - Displaced fracture of lateral end of right 

   clavicle, initial encounter for closed fracture


Disposition:   HOME, SELF-CARE


Condition:  Stable





Departure-Patient Inst.


Decision time for Depature:  10:37


Referrals:  


[facility] [place] ([place])


Primary Care Physician








SCHWAB,TERRY D MD


Patient Instructions:  Clavicle Fracture (DC)





Add. Discharge Instructions:  


Ice 20 minutes on every 2 hours while awake for the next 2 to 3 days to reduce 

swelling and pain related to your clavicle fracture.


Keep the sling on to immobilize your shoulder to reduce pain.


Sometime this week or next follow-up with the orthopedic surgeon.  You can call 

Dr. Schwab at his office for an appointment.


You may continue to take tramadol 1 tablet every 6 hours as necessary for 

breakthrough pain not treated by Tylenol 1000 mg every 8 hours as needed.


All discharge instructions reviewed with patient and/or family. Voiced 

understanding.


Scripts


Tramadol HCl (Tramadol HCl) 50 Mg Tablet


50 MG PO Q6H PRN for PAIN, #15 TAB 0 Refills


   Prov: JEROMY RUTLEDGE         3/10/21





Copy


Copies To 1:   SCHWAB,TERRY D MD WELLER,TITUS J                 Mar 10, 2021 09:39

## 2021-03-10 NOTE — DIAGNOSTIC IMAGING REPORT
INDICATION:  

One day post fall, landing on right shoulder. History of fracture

with pain in the posterior right shoulder with limited range of

motion.



TECHNIQUE: 

Four views of the right shoulder.



CORRELATION STUDY:  

None.



FINDINGS: 

There is a fracture involving the distal clavicle. The distal

component is displaced superiorly approximately half the width of

the bone. Additionally, there is abnormal widening at the

acromioclavicular joint.  There is deformity about the humeral

head and neck, most pronounced along its medial aspect. A

definitive acute fracture line is not visualized and this may

reflect a more remote or previous injury.



IMPRESSION: 

1.  Mildly displaced fracture of the distal right clavicle. There

is questioned sclerosis; however, the age of this is somewhat

indeterminate. There is also abnormal widening at the

acromioclavicular joint.



2. Deformity about the humeral head and neck with age somewhat

indeterminate but favors a probable more remote fracture.



3. If further evaluation is desired, CT imaging would likely be

of additional diagnostic utility.



Dictated by: 



  Dictated on workstation # [id number]

## 2021-08-11 ENCOUNTER — HOSPITAL ENCOUNTER (OUTPATIENT)
Dept: HOSPITAL 75 - RAD | Age: 56
End: 2021-08-11
Attending: NURSE PRACTITIONER
Payer: COMMERCIAL

## 2021-08-11 DIAGNOSIS — Z12.31: Primary | ICD-10-CM

## 2021-08-11 PROCEDURE — 77067 SCR MAMMO BI INCL CAD: CPT

## 2021-08-11 PROCEDURE — 77063 BREAST TOMOSYNTHESIS BI: CPT

## 2021-08-12 NOTE — DIAGNOSTIC IMAGING REPORT
Indication: Routine screening.



No prior mammograms are available for comparison.



2-D and 3-D bilateral screening mammography was performed with

CAD.



Both breasts are heterogeneously dense, limiting the sensitivity

of mammography. No mass or malignant-appearing

microcalcifications are seen. Axillae are unremarkable.



IMPRESSION: BI-RADS Category 1



No mammographic features suspicious for malignancy are

identified.



ACR BI-RADS Category 1: Negative.

Result letter will be mailed to the patient.

Note:  At least 10% of breast cancer is not imaged by

mammography.



Dictated by: 



  Dictated on workstation # [id number]

## 2021-09-07 ENCOUNTER — HOSPITAL ENCOUNTER (OUTPATIENT)
Dept: HOSPITAL 75 - RAD | Age: 56
End: 2021-09-07
Attending: NURSE PRACTITIONER
Payer: COMMERCIAL

## 2021-09-07 DIAGNOSIS — K70.30: Primary | ICD-10-CM

## 2021-09-07 DIAGNOSIS — R16.1: ICD-10-CM

## 2021-09-07 DIAGNOSIS — K80.20: ICD-10-CM

## 2021-09-07 LAB
ALBUMIN SERPL-MCNC: 3.8 GM/DL (ref 3.2–4.5)
ALP SERPL-CCNC: 65 U/L (ref 40–136)
ALT SERPL-CCNC: 26 U/L (ref 0–55)
BASOPHILS # BLD AUTO: 0 10^3/UL (ref 0–0.1)
BASOPHILS NFR BLD AUTO: 2 % (ref 0–10)
BILIRUB SERPL-MCNC: 0.7 MG/DL (ref 0.1–1)
BUN/CREAT SERPL: 14
CALCIUM SERPL-MCNC: 9.2 MG/DL (ref 8.5–10.1)
CHLORIDE SERPL-SCNC: 106 MMOL/L (ref 98–107)
CO2 SERPL-SCNC: 24 MMOL/L (ref 21–32)
CREAT SERPL-MCNC: 0.77 MG/DL (ref 0.6–1.3)
EOSINOPHIL # BLD AUTO: 0 10^3/UL (ref 0–0.3)
EOSINOPHIL NFR BLD AUTO: 1 % (ref 0–10)
GFR SERPLBLD BASED ON 1.73 SQ M-ARVRAT: 78 ML/MIN
GLUCOSE SERPL-MCNC: 95 MG/DL (ref 70–105)
HCT VFR BLD CALC: 33 % (ref 35–52)
HGB BLD-MCNC: 9.7 G/DL (ref 11.5–16)
INR PPP: 1.1 (ref 0.8–1.4)
LYMPHOCYTES # BLD AUTO: 0.6 10^3/UL (ref 1–4)
LYMPHOCYTES NFR BLD AUTO: 31 % (ref 12–44)
MANUAL DIFFERENTIAL PERFORMED BLD QL: NO
MCH RBC QN AUTO: 27 PG (ref 25–34)
MCHC RBC AUTO-ENTMCNC: 30 G/DL (ref 32–36)
MCV RBC AUTO: 90 FL (ref 80–99)
MONOCYTES # BLD AUTO: 0.3 10^3/UL (ref 0–1)
MONOCYTES NFR BLD AUTO: 15 % (ref 0–12)
NEUTROPHILS # BLD AUTO: 1 10^3/UL (ref 1.8–7.8)
NEUTROPHILS NFR BLD AUTO: 52 % (ref 42–75)
PLATELET # BLD: 59 10^3/UL (ref 130–400)
PMV BLD AUTO: 12.6 FL (ref 9–12.2)
POTASSIUM SERPL-SCNC: 3.8 MMOL/L (ref 3.6–5)
PROT SERPL-MCNC: 7.5 GM/DL (ref 6.4–8.2)
PROTHROMBIN TIME: 14.7 SEC (ref 12.2–14.7)
SODIUM SERPL-SCNC: 139 MMOL/L (ref 135–145)
WBC # BLD AUTO: 1.9 10^3/UL (ref 4.3–11)

## 2021-09-07 PROCEDURE — 80053 COMPREHEN METABOLIC PANEL: CPT

## 2021-09-07 PROCEDURE — 85610 PROTHROMBIN TIME: CPT

## 2021-09-07 PROCEDURE — 82105 ALPHA-FETOPROTEIN SERUM: CPT

## 2021-09-07 PROCEDURE — 85025 COMPLETE CBC W/AUTO DIFF WBC: CPT

## 2021-09-07 PROCEDURE — 36415 COLL VENOUS BLD VENIPUNCTURE: CPT

## 2021-09-07 PROCEDURE — 84590 ASSAY OF VITAMIN A: CPT

## 2021-09-07 PROCEDURE — 82306 VITAMIN D 25 HYDROXY: CPT

## 2021-09-07 PROCEDURE — 76700 US EXAM ABDOM COMPLETE: CPT

## 2021-09-07 NOTE — DIAGNOSTIC IMAGING REPORT
INDICATION:  CIRRHOSIS



TECHNIQUE:  Multiple real-time gray scale sonographic images of

the abdomen.



CORRELATION STUDY:  None



FINDINGS:

LIVER:  Heterogeneous echotexture along with a nodular contour

within the visualized portions of the liver.  Liver length 15.7

cm. There is normal, hepatopedal direction of flow within the

main portal vein.  Main portal vein however appears to be

dilated. There is presence of a recanalized umbilical vein.

GALLBLADDER: Multiple shadowing gallstones are present. No bile

duct dilatation.

COMMON BILE DUCT: Nondilated at  0.3 cm.

PANCREAS: Limited in visualization.  The visualized portions

appearing heterogeneous.

SPLEEN:  Enlarged at 13.7 x 6.8 x 11.8 cm.

ABDOMINAL AORTA: Unremarkable.

INFERIOR VENA CAVA: Limited in visualization.

RIGHT KIDNEY: 8.5 x 6.0 x 5.3 cm. Unremarkable.

LEFT KIDNEY: 9.9 x 3.8 x 4.5  cm. Unremarkable.

OTHER: None.





IMPRESSION:

1. Heterogeneous appearance about the liver parenchyma compatible

with underlying cirrhosis. No definitive focal lesion.

2. Normal direction of flow with mild dilatation of the portal

vein but is present. Splenomegaly. Suggesting portal systemic

hypertension.

3. Cholelithiasis.



Dictated by: 



  Dictated on workstation # [id number]

## 2022-01-18 ENCOUNTER — HOSPITAL ENCOUNTER (EMERGENCY)
Dept: HOSPITAL 75 - ER | Age: 57
Discharge: HOME | End: 2022-01-18
Payer: COMMERCIAL

## 2022-01-18 ENCOUNTER — HOSPITAL ENCOUNTER (INPATIENT)
Dept: HOSPITAL 75 - ER | Age: 57
LOS: 1 days | Discharge: HOME | DRG: 760 | End: 2022-01-19
Attending: INTERNAL MEDICINE | Admitting: INTERNAL MEDICINE
Payer: COMMERCIAL

## 2022-01-18 VITALS — SYSTOLIC BLOOD PRESSURE: 137 MMHG | DIASTOLIC BLOOD PRESSURE: 63 MMHG

## 2022-01-18 VITALS — SYSTOLIC BLOOD PRESSURE: 96 MMHG | DIASTOLIC BLOOD PRESSURE: 58 MMHG

## 2022-01-18 VITALS — DIASTOLIC BLOOD PRESSURE: 66 MMHG | SYSTOLIC BLOOD PRESSURE: 111 MMHG

## 2022-01-18 VITALS — SYSTOLIC BLOOD PRESSURE: 134 MMHG | DIASTOLIC BLOOD PRESSURE: 71 MMHG

## 2022-01-18 VITALS
WEIGHT: 110.01 LBS | DIASTOLIC BLOOD PRESSURE: 76 MMHG | SYSTOLIC BLOOD PRESSURE: 132 MMHG | HEIGHT: 66.02 IN | BODY MASS INDEX: 17.68 KG/M2

## 2022-01-18 VITALS — SYSTOLIC BLOOD PRESSURE: 114 MMHG | DIASTOLIC BLOOD PRESSURE: 57 MMHG

## 2022-01-18 VITALS — SYSTOLIC BLOOD PRESSURE: 96 MMHG | DIASTOLIC BLOOD PRESSURE: 56 MMHG

## 2022-01-18 VITALS — WEIGHT: 110.23 LBS | BODY MASS INDEX: 17.72 KG/M2 | HEIGHT: 66.02 IN

## 2022-01-18 VITALS — SYSTOLIC BLOOD PRESSURE: 82 MMHG | DIASTOLIC BLOOD PRESSURE: 50 MMHG

## 2022-01-18 VITALS — SYSTOLIC BLOOD PRESSURE: 137 MMHG | DIASTOLIC BLOOD PRESSURE: 66 MMHG

## 2022-01-18 VITALS — SYSTOLIC BLOOD PRESSURE: 96 MMHG | DIASTOLIC BLOOD PRESSURE: 74 MMHG

## 2022-01-18 VITALS — SYSTOLIC BLOOD PRESSURE: 104 MMHG | DIASTOLIC BLOOD PRESSURE: 66 MMHG

## 2022-01-18 VITALS — DIASTOLIC BLOOD PRESSURE: 57 MMHG | SYSTOLIC BLOOD PRESSURE: 104 MMHG

## 2022-01-18 DIAGNOSIS — N95.0: Primary | ICD-10-CM

## 2022-01-18 DIAGNOSIS — F10.129: ICD-10-CM

## 2022-01-18 DIAGNOSIS — F10.239: ICD-10-CM

## 2022-01-18 DIAGNOSIS — D69.6: ICD-10-CM

## 2022-01-18 DIAGNOSIS — N93.9: ICD-10-CM

## 2022-01-18 DIAGNOSIS — K21.9: ICD-10-CM

## 2022-01-18 DIAGNOSIS — F17.210: ICD-10-CM

## 2022-01-18 DIAGNOSIS — Z88.2: ICD-10-CM

## 2022-01-18 DIAGNOSIS — N39.0: ICD-10-CM

## 2022-01-18 DIAGNOSIS — K70.30: ICD-10-CM

## 2022-01-18 DIAGNOSIS — I10: ICD-10-CM

## 2022-01-18 DIAGNOSIS — I95.9: ICD-10-CM

## 2022-01-18 DIAGNOSIS — D50.0: ICD-10-CM

## 2022-01-18 DIAGNOSIS — D68.9: ICD-10-CM
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
## 2022-01-18 LAB
ALBUMIN SERPL-MCNC: 2.6 GM/DL (ref 3.2–4.5)
ALBUMIN SERPL-MCNC: 3.2 GM/DL (ref 3.2–4.5)
ALP SERPL-CCNC: 145 U/L (ref 40–136)
ALP SERPL-CCNC: 207 U/L (ref 40–136)
ALT SERPL-CCNC: 31 U/L (ref 0–55)
ALT SERPL-CCNC: 41 U/L (ref 0–55)
APTT PPP: YELLOW S
BACTERIA #/AREA URNS HPF: (no result) /HPF
BARBITURATES UR QL: NEGATIVE
BASOPHILS # BLD AUTO: 0 10^3/UL (ref 0–0.1)
BASOPHILS # BLD AUTO: 0.1 10^3/UL (ref 0–0.1)
BASOPHILS # BLD AUTO: 0.1 10^3/UL (ref 0–0.1)
BASOPHILS NFR BLD AUTO: 1 % (ref 0–10)
BASOPHILS NFR BLD AUTO: 2 % (ref 0–10)
BASOPHILS NFR BLD AUTO: 2 % (ref 0–10)
BENZODIAZ UR QL SCN: NEGATIVE
BILIRUB SERPL-MCNC: 1.4 MG/DL (ref 0.1–1)
BILIRUB SERPL-MCNC: 1.5 MG/DL (ref 0.1–1)
BILIRUB UR QL STRIP: NEGATIVE
BUN/CREAT SERPL: 11
BUN/CREAT SERPL: 9
CALCIUM SERPL-MCNC: 7 MG/DL (ref 8.5–10.1)
CALCIUM SERPL-MCNC: 7.8 MG/DL (ref 8.5–10.1)
CHLORIDE SERPL-SCNC: 108 MMOL/L (ref 98–107)
CHLORIDE SERPL-SCNC: 111 MMOL/L (ref 98–107)
CO2 SERPL-SCNC: 19 MMOL/L (ref 21–32)
CO2 SERPL-SCNC: 20 MMOL/L (ref 21–32)
COCAINE UR QL: NEGATIVE
CREAT SERPL-MCNC: 0.64 MG/DL (ref 0.6–1.3)
CREAT SERPL-MCNC: 0.65 MG/DL (ref 0.6–1.3)
EOSINOPHIL # BLD AUTO: 0 10^3/UL (ref 0–0.3)
EOSINOPHIL # BLD AUTO: 0 10^3/UL (ref 0–0.3)
EOSINOPHIL # BLD AUTO: 0.1 10^3/UL (ref 0–0.3)
EOSINOPHIL NFR BLD AUTO: 0 % (ref 0–10)
EOSINOPHIL NFR BLD AUTO: 1 % (ref 0–10)
EOSINOPHIL NFR BLD AUTO: 1 % (ref 0–10)
FIBRINOGEN PPP-MCNC: CLEAR MG/DL
GFR SERPLBLD BASED ON 1.73 SQ M-ARVRAT: 104 ML/MIN
GFR SERPLBLD BASED ON 1.73 SQ M-ARVRAT: 104 ML/MIN
GLUCOSE SERPL-MCNC: 112 MG/DL (ref 70–105)
GLUCOSE SERPL-MCNC: 119 MG/DL (ref 70–105)
GLUCOSE UR STRIP-MCNC: NEGATIVE MG/DL
HCT VFR BLD CALC: 20 % (ref 35–52)
HCT VFR BLD CALC: 20 % (ref 35–52)
HCT VFR BLD CALC: 27 % (ref 35–52)
HGB BLD-MCNC: 5.8 G/DL (ref 11.5–16)
HGB BLD-MCNC: 6.5 G/DL (ref 11.5–16)
HGB BLD-MCNC: 8 G/DL (ref 11.5–16)
INR PPP: 1.5 (ref 0.8–1.4)
KETONES UR QL STRIP: NEGATIVE
LEUKOCYTE ESTERASE UR QL STRIP: (no result)
LYMPHOCYTES # BLD AUTO: 0.5 10^3/UL (ref 1–4)
LYMPHOCYTES # BLD AUTO: 1.1 10^3/UL (ref 1–4)
LYMPHOCYTES # BLD AUTO: 1.1 10^3/UL (ref 1–4)
LYMPHOCYTES NFR BLD AUTO: 13 % (ref 12–44)
LYMPHOCYTES NFR BLD AUTO: 22 % (ref 12–44)
LYMPHOCYTES NFR BLD AUTO: 27 % (ref 12–44)
MANUAL DIFFERENTIAL PERFORMED BLD QL: NO
MCH RBC QN AUTO: 24 PG (ref 25–34)
MCH RBC QN AUTO: 24 PG (ref 25–34)
MCH RBC QN AUTO: 28 PG (ref 25–34)
MCHC RBC AUTO-ENTMCNC: 29 G/DL (ref 32–36)
MCHC RBC AUTO-ENTMCNC: 29 G/DL (ref 32–36)
MCHC RBC AUTO-ENTMCNC: 33 G/DL (ref 32–36)
MCV RBC AUTO: 82 FL (ref 80–99)
MCV RBC AUTO: 83 FL (ref 80–99)
MCV RBC AUTO: 85 FL (ref 80–99)
METHADONE UR QL SCN: NEGATIVE
METHAMPHETAMINE SCREEN URINE S: NEGATIVE
MONOCYTES # BLD AUTO: 0.4 10^3/UL (ref 0–1)
MONOCYTES # BLD AUTO: 0.5 10^3/UL (ref 0–1)
MONOCYTES # BLD AUTO: 0.6 10^3/UL (ref 0–1)
MONOCYTES NFR BLD AUTO: 11 % (ref 0–12)
MONOCYTES NFR BLD AUTO: 11 % (ref 0–12)
MONOCYTES NFR BLD AUTO: 12 % (ref 0–12)
NEUTROPHILS # BLD AUTO: 2.4 10^3/UL (ref 1.8–7.8)
NEUTROPHILS # BLD AUTO: 2.9 10^3/UL (ref 1.8–7.8)
NEUTROPHILS # BLD AUTO: 3 10^3/UL (ref 1.8–7.8)
NEUTROPHILS NFR BLD AUTO: 59 % (ref 42–75)
NEUTROPHILS NFR BLD AUTO: 63 % (ref 42–75)
NEUTROPHILS NFR BLD AUTO: 75 % (ref 42–75)
NITRITE UR QL STRIP: NEGATIVE
OPIATES UR QL SCN: NEGATIVE
OXYCODONE UR QL: NEGATIVE
PH UR STRIP: 6.5 [PH] (ref 5–9)
PLATELET # BLD: 35 10^3/UL (ref 130–400)
PLATELET # BLD: 38 10^3/UL (ref 130–400)
PLATELET # BLD: 43 10^3/UL (ref 130–400)
PMV BLD AUTO: 10.8 FL (ref 9–12.2)
PMV BLD AUTO: 11.6 FL (ref 9–12.2)
POTASSIUM SERPL-SCNC: 3.6 MMOL/L (ref 3.6–5)
POTASSIUM SERPL-SCNC: 3.7 MMOL/L (ref 3.6–5)
PROPOXYPH UR QL: NEGATIVE
PROT SERPL-MCNC: 5.7 GM/DL (ref 6.4–8.2)
PROT SERPL-MCNC: 7.3 GM/DL (ref 6.4–8.2)
PROT UR QL STRIP: NEGATIVE
PROTHROMBIN TIME: 18.6 SEC (ref 12.2–14.7)
RBC #/AREA URNS HPF: (no result) /HPF
SODIUM SERPL-SCNC: 141 MMOL/L (ref 135–145)
SODIUM SERPL-SCNC: 142 MMOL/L (ref 135–145)
SP GR UR STRIP: 1.01 (ref 1.02–1.02)
SQUAMOUS #/AREA URNS HPF: (no result) /HPF
TRICYCLICS UR QL SCN: NEGATIVE
WBC # BLD AUTO: 3.9 10^3/UL (ref 4.3–11)
WBC # BLD AUTO: 4.1 10^3/UL (ref 4.3–11)
WBC # BLD AUTO: 4.9 10^3/UL (ref 4.3–11)
WBC #/AREA URNS HPF: (no result) /HPF

## 2022-01-18 PROCEDURE — 80320 DRUG SCREEN QUANTALCOHOLS: CPT

## 2022-01-18 PROCEDURE — 76856 US EXAM PELVIC COMPLETE: CPT

## 2022-01-18 PROCEDURE — 76830 TRANSVAGINAL US NON-OB: CPT

## 2022-01-18 PROCEDURE — 36415 COLL VENOUS BLD VENIPUNCTURE: CPT

## 2022-01-18 PROCEDURE — 86900 BLOOD TYPING SEROLOGIC ABO: CPT

## 2022-01-18 PROCEDURE — 85025 COMPLETE CBC W/AUTO DIFF WBC: CPT

## 2022-01-18 PROCEDURE — 82140 ASSAY OF AMMONIA: CPT

## 2022-01-18 PROCEDURE — 80306 DRUG TEST PRSMV INSTRMNT: CPT

## 2022-01-18 PROCEDURE — 80053 COMPREHEN METABOLIC PANEL: CPT

## 2022-01-18 PROCEDURE — 84703 CHORIONIC GONADOTROPIN ASSAY: CPT

## 2022-01-18 PROCEDURE — 87088 URINE BACTERIA CULTURE: CPT

## 2022-01-18 PROCEDURE — 87077 CULTURE AEROBIC IDENTIFY: CPT

## 2022-01-18 PROCEDURE — 87186 SC STD MICRODIL/AGAR DIL: CPT

## 2022-01-18 PROCEDURE — 86901 BLOOD TYPING SEROLOGIC RH(D): CPT

## 2022-01-18 PROCEDURE — 85610 PROTHROMBIN TIME: CPT

## 2022-01-18 PROCEDURE — 81000 URINALYSIS NONAUTO W/SCOPE: CPT

## 2022-01-18 PROCEDURE — 86922 COMPATIBILITY TEST ANTIGLOB: CPT

## 2022-01-18 PROCEDURE — 99283 EMERGENCY DEPT VISIT LOW MDM: CPT

## 2022-01-18 PROCEDURE — 86850 RBC ANTIBODY SCREEN: CPT

## 2022-01-18 RX ADMIN — MEGESTROL ACETATE SCH MG: 40 TABLET ORAL at 23:13

## 2022-01-18 RX ADMIN — SODIUM CHLORIDE SCH MLS/HR: 900 INJECTION, SOLUTION INTRAVENOUS at 23:10

## 2022-01-18 RX ADMIN — Medication SCH MG: at 23:10

## 2022-01-18 RX ADMIN — DOCUSATE SODIUM SCH MG: 100 CAPSULE ORAL at 23:11

## 2022-01-18 RX ADMIN — SENNOSIDES SCH MG: 8.6 TABLET, FILM COATED ORAL at 23:13

## 2022-01-18 RX ADMIN — ANTACID TABLETS PRN MG: 500 TABLET, CHEWABLE ORAL at 23:11

## 2022-01-18 RX ADMIN — ONDANSETRON PRN MG: 2 INJECTION, SOLUTION INTRAMUSCULAR; INTRAVENOUS at 20:06

## 2022-01-18 NOTE — DIAGNOSTIC IMAGING REPORT
PROCEDURE: Pelvic comp/transvaginal sonogram.



TECHNIQUE: Complete transabdominal and transvaginal pelvic

ultrasound was performed. In addition, limited pelvic Doppler was

performed.



INDICATION: Postmenopausal bleeding.



Uterus is anteverted measuring 4.7 x 2.3 x 2.4 cm. Endometrium

appears thin at 2 mm. No myometrial mass is detected. Ovaries

could not be visualized due to overlying bowel gas. No adnexal

mass or free fluid is detected.



IMPRESSION: Nonvisualized ovaries. The study is otherwise

unremarkable.







Dictated by: 



  Dictated on workstation # [id number]

## 2022-01-18 NOTE — ED GU-FEMALE
General


Chief Complaint:   - Reproductive


Stated Complaint:  VAGINAL BLEEDING


Nursing Triage Note:  


PT BROUGHT IN BY [facility] FROM HOME WITH COMPLAINT OF VAGINAL BLEEDING. PER EMS, 


PTS INTIAL BP WAS 60s/40s. PT WAS SEEN IN ER LAST NIGHT FOR SAME COMPLAINT.


Source:  patient, EMS, old records


Exam Limitations:  no limitations





History of Present Illness


Date Seen by Provider:  2022


Time Seen by Provider:  13:47


Initial Comments


This 56-year-old woman presents to the emergency room via EMS with complaints of

severe vaginal bleeding and hypotension.  Blood pressure for EMS was initially 

in the 60s/40s before starting IV fluids.  She was still hypotensive on arrival 

with systolic blood pressure in the 80s after about 800 mL of normal saline.  

Patient was seen in this ER earlier this morning.  Hemoglobin was 8.0 at that 

time.  She was to return for an ultrasound today which had not yet occurred.  

Patient still has a uterus.  She had a fairly normal pelvic exam as documented 

by Dr. Almeida.  There was slight bleeding at that time.  Patient has no known

cervical or uterine pathology.  She reports right oophorectomy was performed 

along with an appendectomy.  She is afebrile.  Urinary tract infection was also 

noted this morning.  She is afebrile and denies any significant pain.  Patient 

drinks alcohol daily.  Her blood alcohol level was 441 this morning.  She denies

any other substance use.  She has history of cirrhosis which she states is from 

a medication reaction many years ago and is exacerbated by her alcohol 

consumption.  She has been a patient of Dr. Cross at [facility].  Platelets earlier 

this morning were 43,000.  She is postmenopausal x11 years.





Allergies and Home Medications


Allergies


Coded Allergies:  


     Sulfa (Sulfonamide Antibiotics) (Verified  Allergy, Unknown, 20)





Patient Home Medication List


Home Medication List Reviewed:  Yes


Ergocalciferol (Vitamin D2) (Vitamin D2) 1,250 Mcg Capsule, 1,250 MCG PO FRI, 

(Reported)


   Entered as Reported by: JIMMY ABDULLAHI on 20 1049


Famotidine (Heartburn Relief) 20 Mg Tablet, 20 MG PO DAILY, (Reported)


   Entered as Reported by: JIMMY ABDULLAHI on 20 1049


Furosemide (Furosemide) 40 Mg Tablet, 40 MG PO DAILY, (Reported)


   Entered as Reported by: ROMULO EDMOND on 20 1232


Hydroxyzine HCl (Hydroxyzine HCl) 25 Mg Tablet, 25 MG PO Q8H PRN for ANXIETY, 

(Reported)


   Entered as Reported by: JIMMY ABDULLAHI on 20 1049


Nitrofurantoin Monohyd/M-Cryst (Macrobid 100 mg Capsule) 100 Mg Capsule, 1 TAB 

PO BID


   Prescribed by: AIRAM ALMEIDA on 22 0150


Pantoprazole Sodium (Protonix) 40 Mg Tablet.dr, 40 MG PO BID


   Prescribed by: CHASE LEWIS on 20 1225


Paroxetine HCl (Paroxetine HCl) 10 Mg Tablet, 10 MG PO DAILY, (Reported)


   Entered as Reported by: JIMMY ABDULLAHI on 20 1049


Potassium Chloride (Potassium Chloride) 10 Meq Tab.er.prt, 10 MEQ PO BID


   Prescribed by: CHASE LEWIS on 20 1225


Spironolactone (Aldactone) 100 Mg Tablet, 100 MG PO DAILY, (Reported)


   Entered as Reported by: JIMMY ABDULLAHI on 20 1049


Sucralfate (Sucralfate) 1 Gm Tablet, 1 GM PO QID PRN for ULCERS, (Reported)


   Entered as Reported by: ROMULO EDMOND on 20 1232


Tramadol HCl (Tramadol HCl) 50 Mg Tablet, 50 MG PO Q6H PRN for PAIN


   Prescribed by: JEROMY RUTLEDGE on 3/10/21 1051





Review of Systems


Review of Systems


Constitutional:  no symptoms reported


EENTM:  no symptoms reported


Respiratory:  no symptoms reported


Cardiovascular:  no symptoms reported


Gastrointestinal:  no symptoms reported


Genitourinary:  see HPI


Pregnant:  No


Musculoskeletal:  no symptoms reported


Skin:  no symptoms reported


Psychiatric/Neurological:  No Symptoms Reported


Endocrine:  No Symptoms Reported


Hematologic/Lymphatic:  See HPI





Past Medical-Social-Family Hx


Patient Social History


Tobacco Use?:  No


Use of E-Cig and/or Vaping dev:  No


Substance use?:  No


Alcohol Use?:  Yes


Alcohol Frequency:  Daily





Immunizations Up To Date


Tetanus Booster (TDap):  Unknown





Seasonal Allergies


Seasonal Allergies:  Yes





Past Medical History


Surgery/Hospitalization HX:  


Hyst





Appendix





R ovary


Surgeries:  Yes


Appendectomy, Oophorectomy


Respiratory:  No


Pneumonia


Cardiac:  Yes


Hypertension


Neurological:  No


Reproductive Disorders:  No


Genitourinary:  No


Gastrointestinal:  Yes


Gastroesophageal Reflux, Cirrhosis


Musculoskeletal:  No


Endocrine:  No


HEENT:  No


Cancer:  No


Psychosocial:  No


Integumentary:  No


Blood Disorders:  Yes (low iron )


Adverse Reaction/Blood Tranf:  No





Family Medical History





Patient reports no known family medical history.


No Pertinent Family Hx





Physical Exam


Vital Signs





Vital Signs - First Documented








 22





 13:46 15:00


 


Temp  36.5


 


Pulse 107 


 


Resp 20 


 


B/P (MAP) 96/74 (81) 


 


Pulse Ox 98 


 


O2 Delivery Room Air 





Capillary Refill : Less Than 3 Seconds


Height, Weight, BMI


Height: '"


Weight: lbs. oz. kg; 17.00 BMI


Method:


General Appearance:  WD/WN, mild distress, thin, other (Appears fatigued and 

somnolent)


HEENT:  PERRL/EOMI, normal ENT inspection


Neck:  normal inspection


Cardiovascular:  no edema, no murmur, tachycardia


Respiratory:  lungs clear, normal breath sounds, no respiratory distress


Gastrointestinal:  normal bowel sounds, non tender, soft


Genital/Rectal:  other (External genital exam reveals some bright red blood on a

pad and between the vulva.  No active hemorrhaging is noted at the time of my 

exam.)


Extremities:  normal inspection, no pedal edema


Neurologic/Psychiatric:  no motor/sensory deficits, alert, normal mood/affect, 

oriented x 3


Skin:  normal color, warm/dry





Progress/Results/Core Measures


Suspected Sepsis


SIRS


Temperature: 


Pulse: 100 


Respiratory Rate: 13


 


Laboratory Tests


22 13:50: White Blood Count 4.1L


Blood Pressure 104 /57 


Mean: 73


 


Laboratory Tests


22 13:50: Platelet Count 35*L


22 13:56: 


Creatinine 0.64, INR Comment 1.5H, Total Bilirubin 1.4H








Results/Orders


Lab Results





Laboratory Tests








Test


 22


13:50 22


13:56 Range/Units


 


 


White Blood Count


 4.1 L


 


 4.3-11.0


10^3/uL


 


Red Blood Count


 2.39 L


 


 3.80-5.11


10^6/uL


 


Hemoglobin 5.8 #*L  11.5-16.0  g/dL


 


Hematocrit 20 *L  35-52  %


 


Mean Corpuscular Volume 83   80-99  fL


 


Mean Corpuscular Hemoglobin 24 L  25-34  pg


 


Mean Corpuscular Hemoglobin


Concent 29 L


 


 32-36  g/dL





 


Red Cell Distribution Width 19.0 H  10.0-14.5  %


 


Platelet Count


 35 *L


 


 130-400


10^3/uL


 


Mean Platelet Volume 11.6   9.0-12.2  fL


 


Immature Granulocyte % (Auto) 0    %


 


Neutrophils (%) (Auto) 59   42-75  %


 


Lymphocytes (%) (Auto) 27   12-44  %


 


Monocytes (%) (Auto) 11   0-12  %


 


Eosinophils (%) (Auto) 1   0-10  %


 


Basophils (%) (Auto) 2   0-10  %


 


Neutrophils # (Auto)


 2.4 


 


 1.8-7.8


10^3/uL


 


Lymphocytes # (Auto)


 1.1 


 


 1.0-4.0


10^3/uL


 


Monocytes # (Auto)


 0.5 


 


 0.0-1.0


10^3/uL


 


Eosinophils # (Auto)


 0.0 


 


 0.0-0.3


10^3/uL


 


Basophils # (Auto)


 0.1 


 


 0.0-0.1


10^3/uL


 


Immature Granulocyte # (Auto)


 0.0 


 


 0.0-0.1


10^3/uL


 


Percent Immature Platelet


Fraction 7.2 


 


 0.0-7.6  %





 


Smear Scan     


 


Prothrombin Time  18.6 H 12.2-14.7  SEC


 


INR Comment  1.5 H 0.8-1.4  


 


Sodium Level  142  135-145  MMOL/L


 


Potassium Level  3.7  3.6-5.0  MMOL/L


 


Chloride Level  111 H   MMOL/L


 


Carbon Dioxide Level  20 L 21-32  MMOL/L


 


Anion Gap  11  5-14  MMOL/L


 


Blood Urea Nitrogen  7  7-18  MG/DL


 


Creatinine


 


 0.64 


 0.60-1.30


MG/DL


 


Estimat Glomerular Filtration


Rate 


 104 


  





 


BUN/Creatinine Ratio  11   


 


Glucose Level  119 H   MG/DL


 


Calcium Level  7.0 L 8.5-10.1  MG/DL


 


Corrected Calcium  8.1 L 8.5-10.1  MG/DL


 


Total Bilirubin  1.4 H 0.1-1.0  MG/DL


 


Aspartate Amino Transf


(AST/SGOT) 


 137 H


 5-34  U/L





 


Alanine Aminotransferase


(ALT/SGPT) 


 31 


 0-55  U/L





 


Alkaline Phosphatase  145 H   U/L


 


Total Protein  5.7 L 6.4-8.2  GM/DL


 


Albumin  2.6 L 3.2-4.5  GM/DL


 


Serum Alcohol  249 H <10  MG/DL








My Orders





Orders - MARLI MATIAS MD


Red Cells Leukocytes Reduced (22 13:59)


Type And Screen (22 13:59)


Alcohol (22 14:00)


Cbc With Automated Diff (22 14:00)


Comprehensive Metabolic Panel (22 14:00)


Drug Screen Stat (Urine) (22 14:00)


Protime With Inr (22 14:00)


Ed Iv/Invasive Line Start (22 14:00)


Ns (Ivpb) (Sodium C... W/Tranexamic Acid (22 14:15)


Tranexamic Acid Injection (Cyklokapron I (22 14:15)


Megestrol Tablet (Megace Tablet) (22 09:00)


Platelet Pheresis Lr (22 14:20)


Fresh Frozen Plasma (22 14:20)


Ns Iv 500 Ml (Sodium Chloride 0.9%) (22 14:30)


Ed Iv/Invasive Line Start (22 14:23)


Ondansetron Injection (Zofran Injectio (22 15:15)


Ondansetron Injection (Zofran Injectio (22 15:01)


Us Non Ob Pelvis Comp/Transvag (22 14:26)


Ed Admission (Communication) (22 15:53)


Ceftriaxone 1 Gm Pre-Mix (Rocephin 1 Gm (22 15:55)





Medications Given in ED





Current Medications








 Medications  Dose


 Ordered  Sig/Abisai


 Route  Start Time


 Stop Time Status Last Admin


Dose Admin


 


 Ondansetron HCl  8 mg  ONCE  ONCE


 IVP  22 15:15


 22 15:16 DC 22 15:07


8 MG


 


 Tranexamic Acid


 1000 mg/Sodium


 Chloride  60 ml @ 


 330 mls/hr  ONCE  ONCE


 IV  22 14:15


 22 14:25 DC 22 14:32


330 MLS/HR








Vital Signs/I&O











 1/18/22 1/18/22 1/18/22 1/18/22





 13:46 15:00 15:20 15:30


 


Temp  36.5 36.6 36.5


 


Pulse 107 120 104 105


 


Resp 20 20 18 12


 


B/P (MAP) 96/74 (81) 82/50 96/56 96/58


 


Pulse Ox 98 96 96 97


 


O2 Delivery Room Air  Room Air 


 


    





 22  





 15:45 15:53  


 


Temp 36.5 36.6  


 


Pulse 96 100  


 


Resp 14 13  


 


B/P (MAP) 104/66 104/57  


 


Pulse Ox 97 97  


 


O2 Delivery Room Air Room Air  





Capillary Refill : Less Than 3 Seconds








Blood Pressure Mean:                    73








Progress Note :  


Progress Note


Patient was seen and examined upon arrival.  The first liter of IV fluids was 

completed.  This resuscitated her systolic blood pressure to around 90.  

Tranexamic acid was ordered to help control the bleeding.  I did discuss this 

with the patient prior to administering it.  She is aware that this is an off 

label use and this medication is typically used for trauma.  She states she is 

willing to try anything to get the bleeding to stop without surgery.  Patient 

would be a very poor surgical candidate due to her low platelets, elevated INR, 

and alcohol dependence.  Case was reviewed with Dr. Hoyos who requested 

ultrasound.  Ultrasound showed no gross abnormalities.  I reviewed the cell 

count abnormalities with Dr. Prajapati, .  We agree that patient 

should receive a unit of PRBC, a platelet pack, and 1 unit of FFP.  Patient has 

received the platelet pack and the unit of blood.  FFP is still infusing.  

Rocephin is being given for treatment of bladder infection.  Zofran was given 

for nausea.  Patient continued to have some bleeding during her ER visit.  She 

passed a fairly large clot during the ultrasound.  Further bleeding was roughly 

equivalent to a moderate to heavy menstrual period.





Diagnostic Imaging





   Diagonstic Imaging:  Ultrasound


   Plain Films/CT/US/NM/MRI:  pelvis


Comments


NAME:   CODI STEVENS


MED REC#:   [id number]


ACCOUNT#:   [id number]


PT STATUS:   REG ER


:   1965


PHYSICIAN:   MARLI MATIAS MD


ADMIT DATE:   22/ER


***Draft***


Date of Exam:22





US NON OB PELVIS COMP/TRANSVAG





PROCEDURE: Pelvic comp/transvaginal sonogram.





TECHNIQUE: Complete transabdominal and transvaginal pelvic


ultrasound was performed. In addition, limited pelvic Doppler was


performed.





INDICATION: Postmenopausal bleeding.





Uterus is anteverted measuring 4.7 x 2.3 x 2.4 cm. Endometrium


appears thin at 2 mm. No myometrial mass is detected. Ovaries


could not be visualized due to overlying bowel gas. No adnexal


mass or free fluid is detected.





IMPRESSION: Nonvisualized ovaries. The study is otherwise


unremarkable.





  Dictated on workstation # [id number]





Dict:   22 1511


Trans:   22 1515


[facility] [id number]





Interpreted by:     HUMBERTO JOHNSON MD





Departure


Communication (Admissions)


Time/Spoke to Admitting Phy:  15:40


Dr. Lewis


Time/Spoke to Consulting Phy:  14:15


Dr. Hoyos





Impression





   Primary Impression:  


   Severe anemia


   Additional Impressions:  


   Vaginal hemorrhage


   Urinary tract infection


   Qualified Codes:  N39.0 - Urinary tract infection, site not specified


   Alcohol dependence


   Qualified Codes:  F10.29 - Alcohol dependence with unspecified alcohol-

   induced disorder


Disposition:   ADMITTED AS INPATIENT


Condition:  Improved





Admissions


Decision to Admit Reason:  Admit from ER (General)


Decision to Admit/Date:  2022


Time/Decision to Admit Time:  13:50





Departure-Patient Inst.


Referrals:  


[facility] (PCP/Family)


Primary Care Physician











MARLI MATIAS MD        2022 16:15

## 2022-01-18 NOTE — ED GU-FEMALE
General


Stated Complaint:  VAG BLEEDING


Source:  patient, EMS


Exam Limitations:  no limitations





History of Present Illness


Date Seen by Provider:  Jan 18, 2022


Time Seen by Provider:  00:05


Initial Comments


56-year-old patient presents to the emergency room by ambulance chief complaint 

vaginal bleeding.  Patient states onset of bleeding around 9 PM tonight.  She 

denies sexual activity or foreign objects inserted into the vagina.  She appears

acutely agitated, very concerned about her vaginal bleeding, constantly removing

the blankets to show us the blood.  She refuses to stay covered.  She is 

slightly tachycardic, heart rate 107.  Blood pressure is good.  She seems 

intoxicated on some sort of drug or alcohol.  Wearing sunglasses in the room 

states that she has problems with her eyes.  Difficult to obtain a history from 

her as she will not tell us her medical history other than to say that she got 

"an injured liver from Pepcid".  She cannot recall her pharmacy.  She is slow to

answer questions.





Patient reportedly told EMS that she had had a hysterectomy but told us that she

had 1 ovary removed as well as an appendectomy.





Review of the medical record shows the patient to have a history of alcoholic 

cirrhosis of the liver.





Patient states that she does not smoke cigarettes, use illicit drugs or drink 

alcohol now.


Timing/Duration:  this evening (9 PM)


Severity/Quality:  moderate


Activities at Onset:  none


Sexual Irena History:  not active


Associated Symptoms:  denies symptoms





Allergies and Home Medications


Allergies


Coded Allergies:  


     Sulfa (Sulfonamide Antibiotics) (Verified  Allergy, Unknown, 5/20/20)





Patient Home Medication List


Home Medication List Reviewed:  Yes


Adalimumab (Humira) 40 Mg/0.4 Ml Syringekit, 40 MG SQ EVERY 2 WEEKS, (Reported)


   Entered as Reported by: JIMMY ABDULLAHI on 1/19/22 1021


Alendronate Sodium (Alendronate Sodium) 10 Mg Tablet, 10 MG PO DAILY, (Reported)


   Entered as Reported by: JIMMY ABDULLAHI on 1/19/22 1021


Furosemide (Furosemide) 40 Mg Tablet, 40 MG PO DAILY, (Reported)


   Entered as Reported by: JIMMY ABDULLAHI on 1/19/22 1021


Hydroxyzine HCl (Hydroxyzine HCl) 25 Mg Tablet, 25 MG PO HS, (Reported)


   Entered as Reported by: JIMMY ABDULLAHI on 1/19/22 1021


Lactulose (Lactulose) 10 Gm/15 Ml Solution, 15 ML PO DAILY, (Reported)


   Entered as Reported by: JIMMY ABDULLAHI on 1/19/22 1021


Loratadine (Loratadine) 10 Mg Tablet, 10 MG PO DAILY, (Reported)


   Entered as Reported by: JIMMY ABDULLAHI on 1/19/22 1021


Megestrol Acetate (Megestrol Acetate) 40 Mg Tablet, 40 MG PO BID


   Prescribed by: CHASE LEWIS on 1/19/22 1044


Melatonin (Melatonin) 10 Mg Tablet, 10 MG PO HS, (Reported)


   Entered as Reported by: JIMMY ABDULLAHI on 1/19/22 1021


Nitrofurantoin Monohyd/M-Cryst (Macrobid 100 mg Capsule) 100 Mg Capsule, 1 TAB 

PO BID


   Prescribed by: CHASE LEWIS on 1/19/22 1044


Pantoprazole Sodium (Pantoprazole Sodium) 40 Mg Tablet.dr, 40 MG PO BID, 

(Reported)


   Entered as Reported by: JIMMY ABDULLAHI on 1/19/22 1021


Sennosides (Ex-Lax) 15 Mg Tab.chew, 15 MG PO HS, (Reported)


   Entered as Reported by: JIMMY ABDULLAHI on 1/19/22 1021


Spironolactone (Spironolactone) 100 Mg Tablet, 100 MG PO DAILY, (Reported)


   Entered as Reported by: JIMMY ABDULLAHI on 1/19/22 1021


Vitamin B Complex (B Complex) 1 Each Tablet, 1 EACH PO DAILY


   Prescribed by: CHASE LEWIS on 1/19/22 1044


Discontinued Medications


Ergocalciferol (Vitamin D2) (Vitamin D2) 1,250 Mcg Capsule, 1,250 MCG PO FRI, 

(Reported)


   Discontinued Reason: No Longer Taking


   Entered as Reported by: JIMMY ABDULLAHI on 11/27/20 1049


Famotidine (Heartburn Relief) 20 Mg Tablet, 20 MG PO DAILY, (Reported)


   Discontinued Reason: No Longer Taking


   Entered as Reported by: JIMMY ABDULLAHI on 11/27/20 1049


Furosemide (Furosemide) 40 Mg Tablet, 40 MG PO DAILY, (Reported)


   Discontinued Reason: No Longer Taking


   Entered as Reported by: ROMULO EDMOND on 9/17/20 1232


Hydroxyzine HCl (Hydroxyzine HCl) 25 Mg Tablet, 25 MG PO Q8H PRN for ANXIETY, 

(Reported)


   Discontinued Reason: No Longer Taking


   Entered as Reported by: JIMMY ABDULLAHI on 11/27/20 1049


Nitrofurantoin Monohyd/M-Cryst (Macrobid 100 mg Capsule) 100 Mg Capsule, 1 TAB 

PO BID


   Discontinued Reason: No Longer Taking


   Prescribed by: AIRAM ALMEIDA on 1/18/22 0150


Pantoprazole Sodium (Protonix) 40 Mg Tablet.dr, 40 MG PO BID


   Discontinued Reason: No Longer Taking


   Prescribed by: CHASE LEWIS on 11/29/20 1225


Paroxetine HCl (Paroxetine HCl) 10 Mg Tablet, 10 MG PO DAILY, (Reported)


   Discontinued Reason: No Longer Taking


   Entered as Reported by: JIMMY ABDULLAHI on 11/27/20 1049


Potassium Chloride (Potassium Chloride) 10 Meq Tab.er.prt, 10 MEQ PO BID


   Discontinued Reason: No Longer Taking


   Prescribed by: CHASE LEWIS on 11/29/20 1225


Spironolactone (Aldactone) 100 Mg Tablet, 100 MG PO DAILY, (Reported)


   Discontinued Reason: No Longer Taking


   Entered as Reported by: JIMMY ABDULLAHI on 11/27/20 1049


Sucralfate (Sucralfate) 1 Gm Tablet, 1 GM PO QID PRN for ULCERS, (Reported)


   Discontinued Reason: No Longer Taking


   Entered as Reported by: ROMULO EDMOND on 9/17/20 1232


Tramadol HCl (Tramadol HCl) 50 Mg Tablet, 50 MG PO Q6H PRN for PAIN


   Discontinued Reason: No Longer Taking


   Prescribed by: JEROMY RUTLEDGE on 3/10/21 1051





Review of Systems


Review of Systems


Constitutional:  see HPI


Genitourinary:  other (Vaginal bleeding)


Review of systems difficult to obtain secondary to the patient's degree of 

agitation /inability to focus.





Past Medical-Social-Family Hx


Immunizations Up To Date


Tetanus Booster (TDap):  Unknown





Seasonal Allergies


Seasonal Allergies:  Yes





Past Medical History


Surgeries:  Yes


Appendectomy, Oophorectomy


Respiratory:  No


Pneumonia


Cardiac:  Yes


Hypertension


Neurological:  No


Reproductive Disorders:  No


Genitourinary:  No


Gastrointestinal:  Yes


Gastroesophageal Reflux, Cirrhosis


Musculoskeletal:  No


Endocrine:  No


HEENT:  No


Cancer:  No


Psychosocial:  No


Integumentary:  No


Blood Disorders:  Yes (low iron )


Adverse Reaction/Blood Tranf:  No





Family Medical History





Patient reports no known family medical history.


No Pertinent Family Hx





Physical Exam


Vital Signs





Vital Signs - First Documented








 1/18/22





 00:05


 


Temp 36.3


 


Pulse 107


 


Resp 20


 


B/P (MAP) 132/76 (94)


 


Pulse Ox 97


 


O2 Delivery Room Air





Capillary Refill :


Height, Weight, BMI


Height: '"


Weight: lbs. oz. kg; 16.00 BMI


Method:


General Appearance:  WD/WN, moderate distress (Agitated, all over the bed, 

appears to be on an intoxicant)


HEENT:  PERRL/EOMI, other (Conjunctivae appear pink)


Neck:  full range of motion


Cardiovascular:  regular rate, rhythm, tachycardia (107)


Gastrointestinal:  normal bowel sounds, non tender, soft, no organomegaly


Pelvic:  normal external exam, vaginal bleeding (small clot in vault; no 

significant bleeding from the cervix, blood is suctioned from the vault and 

cervix observed for several minutes - no pooling noted, just a small ooze.  

cervix is well visualized; non tender, no CMT; i was unable to palpate anything 

that feels like uterus ?previsous hysterectomy? no adnexal tenderness 

appreciated - however the patient was extremely agitated during the entiriety of

the exam, moving backward off the pelvic pillow.)


Extremities:  normal range of motion, non-tender, normal inspection, no pedal ed

ema, no calf tenderness, normal capillary refill


Neurologic/Psychiatric:  alert, disoriented x 3 (Disoriented to location)


Skin:  normal color (skin is very brown ? "fake biswas" vs [place]'s ?), warm/dry





Progress/Results/Core Measures


Suspected Sepsis


SIRS


Temperature: 


Pulse:  


Respiratory Rate: 


 


Blood Pressure  / 


Mean: 


 











Results/Orders


Lab Results





My Orders





Vital Signs/I&O


Capillary Refill :


Progress Note :  


   Time:  01:42


Progress Note


Patient reassessed, resting comfortably, no distress other than preoccupation 

with the vaginal bleeding that is persistent.  Patient's hemoglobin is eight.  

Belly exam is unremarkable.  Her platelets are lower than they have been in the 

low forties.  Urine is concerning for infection we will put her on some 

antibiotics, Macrobid.  I also talked with her about her alcohol level this 

evening.  She is in complete denial of this.  Actually tells me that she does 

not have cirrhosis she is just being monitored for liver disease".  I advised 

her that I would give her an outpatient order for a pelvic ultrasound to further

investigate why she is having bleeding.  She will be given contact information 

for the gynecologist on-call, Dr. Murillo.  Return precautions.  All questions 

are sought and answered.





Departure


Impression





   Primary Impression:  


   Post-menopausal bleeding


   Additional Impressions:  


   UTI (urinary tract infection)


   Qualified Codes:  N30.01 - Acute cystitis with hematuria


   Alcohol intoxication


   Qualified Codes:  F10.921 - Alcohol use, unspecified with intoxication 

   delirium


   History of cirrhosis of liver


Disposition:  01 HOME, SELF-CARE


Condition:  Stable





Departure-Patient Inst.


Decision time for Depature:  01:44


Referrals:  


[facility] (PCP/Family)


Primary Care Physician








PIEDAD MURILLO DO


Patient Instructions:  Bleeding After Menopause, Urinary Tract Infection, Adult 

(DC)





Add. Discharge Instructions:  


You can take over the counter Ibuprofen 2-3 pills (400-600mg) every 8 hours for 

abdominal cramping.  This may slow down your bleeding.





I have given you an out-patient order to get a PELVIC ULTRASOUND, call 

[phone] tomorrow morning to get this scheduled.  You will need to follow up

this week with the gynecologist, I have added Dr Murillo's name and contact 

information to your paperwork.  Please call tomorrow for a follow up appoint

ment.





If you have worse bleeding, or a passing out spell, you need to come back to the

ER for re-evaluation.  You can also call your primary provider at [facility] for follow

up this week.





You should not drink alcohol with liver disease.  Please avoid tylenol products.





I have sent a prescription for antibiotics for your urinary tract infection.





Copy


Copies To 1:   ANNE LOPEZ DO


Copies To 2:   PIEDAD MURILLO KATHRYN M MD         Jan 18, 2022 00:25

## 2022-01-18 NOTE — XMS REPORT
Clinical Summary

                             Created on: 2022



BernardoVirginia Aurora

External Reference #: [id number]

: 1965

Sex: Female



Demographics





                          Address                   [address] [place]  [address]

 

                          Home Phone                [phone]

 

                          Preferred Language        English

 

                          Marital Status            

 

                          Gnosticism Affiliation     [id number]

 

                          Race                      White

 

                          Ethnic Group              Not  or 





Author





                          Author                    [facility]

 

                          Organization              [facility]

 

                          Address                   Unknown

 

                          Phone                     Unavailable







Support





                Name            Relationship    Address         Phone

 

                Ashley Gomez     ECON            Unknown         [phone]







Care Team Providers





                    Care Team Member Name Role                Phone

 

                    Kirsten Avila MD   PCP                 [phone]







Source Comments

Some departments are not documenting in the electronic medical record.  If you d
o not see the information that you expected, contact Release of Information in [facility] Information Management department at [phone] for further assistan
ce in locating additional records.[facility]



Allergies





                                        Comments



                 Active Allergy  Reactions       Severity        Noted Date 

 

                                        



Severe fluid on abdomen area



                 Cortisone       SEE COMMENTS    Low             2021 

 

                                         



                 Sulfa (Sulfonamide  ANAPHYLAXIS     High            2020 



                                         Antibiotics)    







Medications





                          End Date                  Status



              Medication   Sig          Dispensed    Refills      Start  



                                         Date  

 

                                                    Active



                 spironolactone  Take 100 mg     0                 



                     (ALDACTONE) 100 mg tablet  by mouth            0  



                                         daily.     

 

                                                    Active



                 furosemide (LASIX) 40 mg  Take 20 mg by   0                 



                     tablet              mouth daily.        0  

 

                                                    Active



                 pantoprazole DR  Take 40 mg by   0                 



                     (PROTONIX) 40 mg tablet  mouth twice         0  



                                         daily.     







Active Problems





 



                           Problem                   Noted Date

 

 



                           Alcoholic cirrhosis of liver without ascites  

021

 

 



                           Alcohol problem drinking  2021







Social History





                                        Date



                 Tobacco Use     Types           Packs/Day       Years Used 

 

                                         



                                         Never Smoker    

 

    



                                         Smokeless Tobacco: Never   



                                         Used   







                                        Comments



                           Alcohol Use               Standard Drinks/Week 

 

                                         



                           Yes                       0 (1 standard drink = 0.6 o

z pure alcohol) 







 



                           Sex Assigned at Birth     Date Recorded

 

 



                           Female                    2021  9:40 AM CDT







Last Filed Vital Signs





                    Reading             Time Taken          Comments



                                         Vital Sign   

 

                    112/61              2020  1:26 PM CDT per pt.



                                         Blood Pressure   

 

                    76                  2020  1:26 PM CDT per pt.



                                         Pulse   

 

                    36.2 C (97.2 F) 2020  1:26 PM CDT per pt.



                                         Temperature   

 

                    20                  2020 11:16 AM CDT  



                                         Respiratory Rate   

 

                    98%                 2020  1:26 PM CDT per pt.



                                         Oxygen Saturation   

 

                    -                   -                    



                                         Inhaled Oxygen   



                                         Concentration   

 

                    49 kg (108 lb)      2020  1:26 PM CDT per pt.



                                         Weight   

 

                    167.6 cm (5' 6")    2020  1:26 PM CDT per pt.



                                         Height   

 

                    17.43               2020  1:26 PM CDT  



                                         Body Mass Index   







Plan of Treatment





   



                 Health Maintenance  Due Date        Last Done       Comments

 

   



                           HIV SCREENING             1981  

 

   



                           DTAP/TDAP VACCINES (1 -   1984  



                                         Tdap)   

 

   



                           HEPATITIS C SCREENING     1984  

 

   



                           PHYSICAL (COMPREHENSIVE)  1984  



                                         EXAM   

 

   



                           CERVICAL CANCER SCREENING  1987  

 

   



                           BREAST CANCER SCREENING   2006  

 

   



                           COLORECTAL CANCER         2016  



                                         SCREENING   

 

   



                           SHINGLES RECOMBINANT      2016  



                                         VACCINE (1 of 2)   

 

   



                           INFLUENZA VACCINE         2021  







Results

Not on filefrom Last 3 Months



Insurance





                                        Type



            Payer      Benefit    Subscriber ID  Effective  Phone      Address 



                           Plan /                    Dates   



                                         Group     

 

                                         



            [employer] MICHAEL   [id number]  2020-P  [phone]  [address] 



                     [place]                  [address]              [address] 



                                         [place] 



                                         [address] 







     



            Guarantor Name  Account    Relation to  Date of    Phone      Billin

g Address



                     Type                Patient             Birth  

 

     



            Bernardo,Virginia Simon  Personal/F  Self       1965  [phone] [address]               (Home)              [place] [address]







Advance Directives





                          Patient Representative    Explanation



                           Type                      Date Recorded  

 

                                                     



                                         Advance   



                                         Directive/DPOA   







Care Teams





                          Start Date                End Date



                     Team Member         Relationship        Specialty  

 

                          20                    



                     Kirsten Avila MD  PCP - General       Internal  



                           [address] [place] [id number] [phone] (Work)    



                                         [phone] (Fax)

## 2022-01-19 VITALS — SYSTOLIC BLOOD PRESSURE: 146 MMHG | DIASTOLIC BLOOD PRESSURE: 79 MMHG

## 2022-01-19 VITALS — DIASTOLIC BLOOD PRESSURE: 65 MMHG | SYSTOLIC BLOOD PRESSURE: 143 MMHG

## 2022-01-19 VITALS — SYSTOLIC BLOOD PRESSURE: 146 MMHG | DIASTOLIC BLOOD PRESSURE: 70 MMHG

## 2022-01-19 VITALS — DIASTOLIC BLOOD PRESSURE: 60 MMHG | SYSTOLIC BLOOD PRESSURE: 135 MMHG

## 2022-01-19 VITALS — DIASTOLIC BLOOD PRESSURE: 79 MMHG | SYSTOLIC BLOOD PRESSURE: 146 MMHG

## 2022-01-19 VITALS — SYSTOLIC BLOOD PRESSURE: 140 MMHG | DIASTOLIC BLOOD PRESSURE: 66 MMHG

## 2022-01-19 VITALS — SYSTOLIC BLOOD PRESSURE: 136 MMHG | DIASTOLIC BLOOD PRESSURE: 63 MMHG

## 2022-01-19 LAB
ALBUMIN SERPL-MCNC: 2.8 GM/DL (ref 3.2–4.5)
ALP SERPL-CCNC: 151 U/L (ref 40–136)
ALT SERPL-CCNC: 30 U/L (ref 0–55)
AMMONIA PLAS-SCNC: 43 UMOL/L (ref 11–32)
BASOPHILS # BLD AUTO: 0 10^3/UL (ref 0–0.1)
BASOPHILS NFR BLD AUTO: 1 % (ref 0–10)
BILIRUB SERPL-MCNC: 2.8 MG/DL (ref 0.1–1)
BUN/CREAT SERPL: 12
CALCIUM SERPL-MCNC: 7.1 MG/DL (ref 8.5–10.1)
CHLORIDE SERPL-SCNC: 109 MMOL/L (ref 98–107)
CO2 SERPL-SCNC: 17 MMOL/L (ref 21–32)
CREAT SERPL-MCNC: 0.6 MG/DL (ref 0.6–1.3)
EOSINOPHIL # BLD AUTO: 0 10^3/UL (ref 0–0.3)
EOSINOPHIL NFR BLD AUTO: 1 % (ref 0–10)
GFR SERPLBLD BASED ON 1.73 SQ M-ARVRAT: 106 ML/MIN
GLUCOSE SERPL-MCNC: 94 MG/DL (ref 70–105)
HCT VFR BLD CALC: 25 % (ref 35–52)
HGB BLD-MCNC: 8 G/DL (ref 11.5–16)
INR PPP: 1.3 (ref 0.8–1.4)
LYMPHOCYTES # BLD AUTO: 0.6 10^3/UL (ref 1–4)
LYMPHOCYTES NFR BLD AUTO: 17 % (ref 12–44)
MANUAL DIFFERENTIAL PERFORMED BLD QL: NO
MCH RBC QN AUTO: 27 PG (ref 25–34)
MCHC RBC AUTO-ENTMCNC: 32 G/DL (ref 32–36)
MCV RBC AUTO: 84 FL (ref 80–99)
MONOCYTES # BLD AUTO: 0.5 10^3/UL (ref 0–1)
MONOCYTES NFR BLD AUTO: 12 % (ref 0–12)
NEUTROPHILS # BLD AUTO: 2.6 10^3/UL (ref 1.8–7.8)
NEUTROPHILS NFR BLD AUTO: 69 % (ref 42–75)
PLATELET # BLD: 30 10^3/UL (ref 130–400)
PMV BLD AUTO: 10.2 FL (ref 9–12.2)
POTASSIUM SERPL-SCNC: 3.6 MMOL/L (ref 3.6–5)
PROT SERPL-MCNC: 5.9 GM/DL (ref 6.4–8.2)
PROTHROMBIN TIME: 16.6 SEC (ref 12.2–14.7)
SODIUM SERPL-SCNC: 135 MMOL/L (ref 135–145)
WBC # BLD AUTO: 3.8 10^3/UL (ref 4.3–11)

## 2022-01-19 RX ADMIN — ANTACID TABLETS PRN MG: 500 TABLET, CHEWABLE ORAL at 12:13

## 2022-01-19 RX ADMIN — SODIUM CHLORIDE SCH MLS/HR: 900 INJECTION, SOLUTION INTRAVENOUS at 09:20

## 2022-01-19 RX ADMIN — Medication SCH MG: at 09:20

## 2022-01-19 RX ADMIN — ONDANSETRON PRN MG: 2 INJECTION, SOLUTION INTRAMUSCULAR; INTRAVENOUS at 14:49

## 2022-01-19 RX ADMIN — SENNOSIDES SCH MG: 8.6 TABLET, FILM COATED ORAL at 09:20

## 2022-01-19 RX ADMIN — MEGESTROL ACETATE SCH MG: 40 TABLET ORAL at 09:58

## 2022-01-19 RX ADMIN — DOCUSATE SODIUM SCH MG: 100 CAPSULE ORAL at 09:20

## 2022-01-19 NOTE — CONSULTATION
History of Present Illness


History of Present Illness


Patient Consulted On(sandra/time)


22


 08:41


Date Seen by Provider:  2022


Time Seen by Provider:  08:30


Reason for Visit:  Vaginal hemorrhage, post menopausal bleeding


History of Present Illness


This is a 56 year old  female with reported menopause many years ago (at one

point said 11 years, but also told me 20 years ago, in "my 30s").  Has not had 

any bleeding until 2 night ago when she reports sudden onset of hemorrhage.  

Bright red blood per vagina.  She presented to the ED on  and was seen to be

bleeding from the vagina.  Exam revealed blood in the vault but no masses.  She 

had hgb of 8 and plts of 40,000.  Had etoh level > 400.  Was sent home early on 

Tuesday with order to have outpatient Us and follow up with Dr. Murillo in the 

office.  She presented on Tuesday via EMS with continued heavy bleeding but had 

unstable vitals.  On presentation to the ED plts were in the 20,000 and Hgb was 

5.  She had US that showed no masses, endometrium was 2 mm thickness.





She was given IV TXA for bleeding and started on transfusion protocol (has 

received PRBCs, plts, FFP).  Started on Megace for  bleeding.


Currently she states she had to have her pad changed twice yesterday and once 

again at 0130.  But reports she is having more clots and not hemorrhage.  On my 

exam she is not currently bleeding.





She has history of RSO due to what sounds like TOA at the time of an 

appendectomy.  She has history of pap smear in the last few weeks at [facility] 

and reports this was negative.  Reports pap prior to that was at least 5 years 

earlier.  No history of abnormal pap.  





She is G1 with history of SAB and possible D&C.  


Had colonoscopy about 1 1/2 years ago due to rectal bleeding.  Dx gastritis and 

internal hemorrhoids.  Biopsy was negative.  Per records on [facility] has had 

anemia and thrombocytopenia for quite awhile.  CT and US consistent with 

cirrhotic changes of the liver and portal hypertension.  Cholelithiasis noted on

previous exams as well.  Patient reports she "does not have cirrhosis" and that 

"they are just watching my liver".





Allergies and Home Medications


Allergies


Coded Allergies:  


     Sulfa (Sulfonamide Antibiotics) (Verified  Allergy, Unknown, 20)





Patient Home Medication List


Home Medication List Reviewed:  Yes


Ergocalciferol (Vitamin D2) (Vitamin D2) 1,250 Mcg Capsule, 1,250 MCG PO FRI, 

(Reported)


   Entered as Reported by: JIMMY ABDULLAHI on 20 1049


Famotidine (Heartburn Relief) 20 Mg Tablet, 20 MG PO DAILY, (Reported)


   Entered as Reported by: JIMMY ABDULLAHI on 20 1049


Furosemide (Furosemide) 40 Mg Tablet, 40 MG PO DAILY, (Reported)


   Entered as Reported by: ROMULO EDMOND on 20 1232


Hydroxyzine HCl (Hydroxyzine HCl) 25 Mg Tablet, 25 MG PO Q8H PRN for ANXIETY, 

(Reported)


   Entered as Reported by: JIMMY ABDULLAHI on 20 1049


Nitrofurantoin Monohyd/M-Cryst (Macrobid 100 mg Capsule) 100 Mg Capsule, 1 TAB 

PO BID


   Prescribed by: AIRAM ALMEIDA on 22 0150


Pantoprazole Sodium (Protonix) 40 Mg Tablet.dr, 40 MG PO BID


   Prescribed by: CHASE LEWIS on 20 1225


Paroxetine HCl (Paroxetine HCl) 10 Mg Tablet, 10 MG PO DAILY, (Reported)


   Entered as Reported by: JIMMY ABDULLAHI on 20 1049


Potassium Chloride (Potassium Chloride) 10 Meq Tab.er.prt, 10 MEQ PO BID


   Prescribed by: CHASE LEWIS on 20 1225


Spironolactone (Aldactone) 100 Mg Tablet, 100 MG PO DAILY, (Reported)


   Entered as Reported by: JIMMY ABDULLAHI on 20 1049


Sucralfate (Sucralfate) 1 Gm Tablet, 1 GM PO QID PRN for ULCERS, (Reported)


   Entered as Reported by: ROMULO EDMOND on 20 1232


Tramadol HCl (Tramadol HCl) 50 Mg Tablet, 50 MG PO Q6H PRN for PAIN


   Prescribed by: JEROMY RUTLEDGE on 3/10/21 1051





Past Medical-Social-Family Hx


Patient Social History


Tobacco Use?:  No


Smoking Status:  Never a Smoker


Use of E-Cig and/or Vaping dev:  No


Substance use?:  No


Alcohol Use?:  Yes


Alcohol type:  Beer


Alcohol Frequency:  Daily


Pt feels they are or have been:  Yes





Immunizations Up To Date


Tetanus Booster (TDap):  Unknown


Influenza Vaccine Up-to-Date:  Yes; Up-to-Date





Seasonal Allergies


Seasonal Allergies:  Yes





Past Medical History


Surgery/Hospitalization HX:  


Hyst





Appendix





R ovary


Surgeries:  Yes


Appendectomy, Oophorectomy


Respiratory:  No


Pneumonia


Cardiac:  Yes


Hypertension


Neurological:  No


Hx :  1


Hx Para:  0


Hx Total # of Abortions (Sp):  1


Reproductive Disorders:  No


Genitourinary:  No


Gastrointestinal:  Yes


Gastroesophageal Reflux, Cirrhosis


Musculoskeletal:  No


Endocrine:  No


HEENT:  No


Cancer:  No


Psychosocial:  No


Integumentary:  No


Blood Disorders:  Yes (low iron )


Adverse Reaction/Blood Tranf:  No





Family Medical History





Patient reports no known family medical history.


No Pertinent Family Hx





Physical Exam-General Problems


Physical Exam


Vital Signs





Vital Signs - First Documented








 22





 13:46 15:00 19:58


 


Temp  36.5 


 


Pulse 107  


 


Resp 20  


 


B/P (MAP) 96/74 (81)  


 


Pulse Ox 98  


 


O2 Delivery Room Air  


 


FiO2   21





Capillary Refill : Less Than 3 Seconds


General Appearance:  no apparent distress


Gastrointestinal:  No guarding, No rebound, No tenderness; hepatomegaly, 

spleenomegaly


Genital/Rectal:  other (pelvic exam done,  cervix is small without mass noted.  

There is no blood in the vault or from the cervix at this time.  Uterus is not 

enlarged and there are no adnexal masses noted. )





Assessment/Plan


Assessment/Plan


Admission Diagnosis/Plan


1.  Vaginal hemorrhage


2.  post menopausal bleeding


3.  severe anemia


4.  thrombocytopenia


5.  coagulopathy


6.  liver disease/alcoholic cirrhosis


7.  UTI diagnosed in the UTI - will continue the antibiotics outpatient 

(Macrodantin).  Give 1 dose of IV rocephin now








Plan - currently bleeding has stopped, likely due to TXA.  Have started her on 

megace 40 mg bid to help with postmenopausal bleeding but she will need a full 

workup.  US was negative for masses or thickened endometrium.  Reports having a 

pap smear at [facility] a few weeks ago that was negative.  Bleeding may be

due to coagulopathy, but need to continue workup so will see her in the office 

after discharge for endometrial biopsy.  She will DC home with megace 40 mg bid 

(this can be increased to up to 800 mg bid in divided doses and does not need to

be dosed for liver disease).  Could do TXA 1300 mg tid for up to 5 days to stop 

bleeding as well, but she is currently not bleeding.  APAP stopped due to liver 

disease.


Admission Status:  Other





Clinical Quality Measures


DVT/VTE Risk/Contraindication:


Contraindications-Pharm:  Other *list below*


Other:  


menorrhagia severe











PRATEEK QUINTERO DO               2022 08:46

## 2022-01-19 NOTE — SHORT STAY SUMMARY-HOSPITALIST
ABRAN CARBAJAL 1/19/22 1228:


History of Present Illness


HPI/Chief Complaint


CC: Vaginal bleeding


HPI: Patient presented to the ED yesterday, 1/18, via EMS with hypotension and 

vaginal bleeding. She was previously in the ED earlier that day and had been 

sent home with instruction to arrive later for transabdominal and transvaginal 

ultrasounds. At the time her hemoglobin was 8.0. IV fluids were initiated. Her 

blood alcohol was 441. Serum alcohol 249. Platelets were 43,000. Patient is 

post-menopausal for 11 years. UTI was noted. Tranexamic acid was administered 

for bleeding control. Patient received 1 unit of PRBC, 1 platelet pack, and 1 

unit of FFP. Rocephin initiated for UTI management.


This morning the patient is laying in bed resting and appears stable. She states

she feels tired and dizzy. She reports the vaginal bleeding began on Saturday. 

She reports soaking full bathroom towels with bright red blood. She says when 

she came to the hospital she started passing blood clots. She states before 

transferring to Cardiac Step-down the bleeding had appeared to stop. When she 

arrived to the floor she reports that the bleeding had started again. Currently 

she says the bleeding is mild/spotting. She denies previous occurrence. 


She states she sees Dr. Pepe at [facility] for management of her cirrhosis.


Source:  patient, EMS notes reviewed


Exam Limitations:  no limitations


Date Seen


1/19/22


Time Seen by a Provider:  10:15


Attending Physician


Gerri Lewis DO


[facility]


Referring Physician





Date of Admission


Jan 18, 2022 at 15:54





Home Medications & Allergies


Home Medications


Reviewed patient Home Medication Reconciliation performed by pharmacy medication

reconciliations technician and/or nursing.


Patients Allergies have been reviewed.





Allergies





Allergies


Coded Allergies


  Sulfa (Sulfonamide Antibiotics) (Verified Allergy, Unknown, 5/20/20)








Past Medical/Social/Family Hx


Patient Social History


Marrital Status:  single


Employed/Student:  retired ()


Tobacco Use?:  No


Smoking Status:  Never a Smoker


Use of E-Cig and/or Vaping dev:  No


Substance use?:  No


Alcohol Use?:  Yes


Alcohol type:  Beer


Alcohol Frequency:  Daily


Pt stated abuse/neglect:  Yes





Immunizations Up To Date


Influenza Vaccine Up-to-Date:  Yes; Up-to-Date


Tetanus Booster (TDap):  Unknown





Current Status


Pregnancy status:  No


Breastfeeding status:  No


Advance Directives:  No


Communicates:  Verbally


Primary Language:  English


Preferred Spoken Language:  English


Is interpretation needed?:  No


Implanted or Applied Medical D:  None





Past Medical History





HTN


GERD


Alcoholic cirrhosis


Iron deficiency


Pneumonia


Alcohol abuse disorder





Family Medical History


Family Hx:  


No pertinent family history





Review of Systems


Constitutional:  dizziness, weakness, other (fatigue)


EENTM:  other (sinus congestion, frontal sinus pain)


Respiratory:  short of breath


Cardiovascular:  No chest pain, No edema; palpitations (1 event of palpitations 

last evening)


Gastrointestinal:  No abdominal pain, No constipation; loss of appetite; No 

nausea (2-3 episodes of emesis last night), No vomiting (2-3 episodes of emesis 

last night. Nausea has resolved)


Genitourinary:  see HPI


Pregnant:  No


Musculoskeletal:  back pain (lower back pain)


Psychiatric/Neurological:  Headache (sinus headache)





Physical Exam


Physical Exam


Vital Signs





Vital Signs - First Documented








 1/18/22 1/18/22 1/18/22





 13:46 15:00 19:58


 


Temp  36.5 


 


Pulse 107  


 


Resp 20  


 


B/P (MAP) 96/74 (81)  


 


Pulse Ox 98  


 


O2 Delivery Room Air  


 


FiO2   21





Capillary Refill : Less Than 3 Seconds


Height, Weight, BMI


Height: '"


Weight: lbs. oz. kg; 17.77 BMI


Method:


General Appearance:  No Apparent Distress, Thin, Other (pallor)


Eyes:  Bilateral Eye Conjunctivae Pale


HEENT:  Pale Conjunctivae (L), Pale Conjunctivae (R)


Respiratory:  Lungs Clear, Normal Breath Sounds, No Accessory Muscle Use, No 

Respiratory Distress


Cardiovascular:  Regular Rate, Rhythm, No Edema, No Murmur, Normal Peripheral P

ulses (right radial +2)


Gastrointestinal:  Normal Bowel Sounds, Non Tender, Soft


Extremity:  No Pedal Edema


Neurologic/Psychiatric:  Alert, Other (patient is oriented to person, place, and

date. There is some confusion as to how long she has been in the hospital, 

likely due to alcohol abuse)


Skin:  Warm/Dry, Pallor





Results


Results/Procedures


Labs


Laboratory Tests


1/18/22 13:50








1/18/22 13:56








1/18/22 20:08








1/19/22 05:50








Patient resulted labs reviewed.


Imaging:  Reviewed Imaging Report





Short Stay Diagnosis


Discharge Diagnosis-Short Stay


Admission Diagnosis


Severe anemia with vaginal hemorrhage


Final Discharge Diagnosis


Vaginal hemorrhage


Severe anemia, Hemoglobin of 8.0 s/p 1 unit of PRBC, 1 platelet packet, and 1 

unit FFP 


UTI


Alcohol dependence


Thrombocytopenia


Coagulopathy


Alcoholic liver cirrhosis





Conclusion


Plan


Folic acid supplementation, Thiamine supplementation, and Multivitamin for 

deficiencies likely related to alcoholic cirrhosis


Megace 40mg BID


Endometrial biopsy outpatient with Dr. Hoyos


Macrobid 100mg BID for 5 days for UTI





Clinical Quality Measures


DVT/VTE Risk/Contraindication:


Contraindications-Pharm:  Other *list below*


Other:  


menorrhagia severe





GERRI LEWIS DO 1/20/22 0553:


History of Present Illness


HPI/Chief Complaint


CC: Menorrhagia





HPI: 56 yr old [facility] clinic pt of [facility] who has a known history of alcoholic 

cirrhosis. Sees Dr. Pepe at . Who can't cease consuming alcohol. Who reports 

dysfunctional uterine bleeding started on Saturday and went to the ER, labs 

checked and sent home. She returned back in an ambulance due to severe 

hemorrhage. She was found to have a hemoglobin of 5.8. Received 1 unit of blood 

and increased to 6.5 and after the second unit she remains at 8.0. She was given

1 unit of blood and 1 unit of platelets in FFP. She will be placed on Macrobid 

after Rocephin. Transitioned from in-patient and she will go home today with 

close follow-up with Dr. Hoyos in one week.


Source:  patient


Exam Limitations:  no limitations





Past Medical/Social/Family Hx


Patient Social History


Marrital Status:  single


Employed/Student:  retired ()


Smoking Status:  Current Everyday Smoker





Review of Systems


Constitutional:  see HPI, dizziness, weakness





Physical Exam


Physical Exam


General Appearance:  No Apparent Distress, WD/WN, Chronically ill


Eyes:  Bilateral Eye Normal Inspection, Bilateral Eye PERRL


HEENT:  PERRL/EOMI, Normal ENT Inspection, Pharynx Normal


Neck:  Full Range of Motion, Normal Inspection, Non Tender, Supple, Carotid 

Bruit


Respiratory:  Chest Non Tender, Lungs Clear, Normal Breath Sounds, No Accessory 

Muscle Use, No Respiratory Distress


Cardiovascular:  Regular Rate, Rhythm, No Edema, No Gallop, No JVD, No Murmur, 

Normal Peripheral Pulses


Gastrointestinal:  Normal Bowel Sounds, No Organomegaly, No Pulsatile Mass, Non 

Tender, Soft


Back:  Normal Inspection, No CVA Tenderness, No Vertebral Tenderness


Extremity:  Normal Capillary Refill, Normal Inspection, Normal Range of Motion, 

Non Tender, No Calf Tenderness, No Pedal Edema


Neurologic/Psychiatric:  Alert, Oriented x3, No Motor/Sensory Deficits, Normal 

Mood/Affect


Skin:  Normal Color, Warm/Dry


Lymphatic:  No Adenopathy





Short Stay Diagnosis


Discharge Diagnosis-Short Stay


Admission Diagnosis


Vaginal hemorrhage


Alcoholic cirrhosis


Alcohol withdrawal





Plan:


Discharge home


Dr. Hoyos appointment


Final Discharge Diagnosis


vaginal hemorrhage s/p 2 units of blood





Conclusion


Plan


DC home





Supervisory-Addendum Brief


Verification & Attestation


Participated in pt care:  history, MDM, physical


Personally performed:  exam, history, MDM, supervision of care


Care discussed with:  Medical Student


Procedures:  n/a


Results interpretation:  Verified all documentation


Verification and Attestation of Medical Student E/M Service





A medical student performed and documented this service in my presence. I revie

wed and verified all information documented by the medical student and made 

modifications to such information, when appropriate. I personally performed the 

physical exam and medical decision making. 





 Gerri Lewis Jan 20, 2022,05:50











ABRAN CARBAJAL               Jan 19, 2022 12:28


GERRI LEWIS DO                Jan 20, 2022 05:53

## 2022-03-21 ENCOUNTER — HOSPITAL ENCOUNTER (OUTPATIENT)
Dept: HOSPITAL 75 - RAD | Age: 57
End: 2022-03-21
Attending: NURSE PRACTITIONER
Payer: COMMERCIAL

## 2022-03-21 DIAGNOSIS — K80.20: Primary | ICD-10-CM

## 2022-03-21 DIAGNOSIS — K70.30: ICD-10-CM

## 2022-03-21 PROCEDURE — 76700 US EXAM ABDOM COMPLETE: CPT

## 2022-03-21 NOTE — DIAGNOSTIC IMAGING REPORT
INDICATION: 

Cirrhosis. 



PROCEDURE: 

Ultrasound abdomen complete.



TECHNIQUE: 

Multiple Real-time grayscale images were obtained of the abdomen

in various projections.



COMPARISON:   

Prior ultrasound from 09/07/2021.



FINDINGS:

The liver measures approximate 14.4 cm in size. A nodular contour

to the liver with coarse parenchymal heterogeneity is again

noted, consistent with cirrhosis. No discrete liver mass is

detected. The portal vein does remain patent and does show normal

direction of flow. There are numerous varices adjacent to the

left lobe again noted. The spleen remains upper limits at

approximately 13 cm. The features remain compatible with portal

hypertension. No definite ascites is present, however. The

pancreas is grossly unremarkable. The gallbladder does contain

small stones. There is no wall thickening or biliary ductal

dilatation. The aorta is nonaneurysmal. The IVC is patent. The

right kidney is small at 7.6 cm. The left kidney is 10.1 cm. No

definite calculus or hydronephrosis is detected.



IMPRESSION: 

1. Stable appearance to the abdomen since the exam from

09/07/2021. Features remain consistent with cirrhosis and portal

hypertension. No discrete liver mass is identified. There is no

ascites.



2. Cholelithiasis without evidence of acute cholecystitis.



Dictated by: 



  Dictated on workstation # [id number]

## 2022-07-19 ENCOUNTER — HOSPITAL ENCOUNTER (OUTPATIENT)
Dept: HOSPITAL 75 - ER | Age: 57
LOS: 2 days | Discharge: HOME | End: 2022-07-21
Attending: FAMILY MEDICINE
Payer: COMMERCIAL

## 2022-07-19 VITALS — SYSTOLIC BLOOD PRESSURE: 110 MMHG | DIASTOLIC BLOOD PRESSURE: 69 MMHG

## 2022-07-19 VITALS — DIASTOLIC BLOOD PRESSURE: 65 MMHG | SYSTOLIC BLOOD PRESSURE: 118 MMHG

## 2022-07-19 VITALS — HEIGHT: 64.06 IN | BODY MASS INDEX: 19.68 KG/M2 | WEIGHT: 115.3 LBS

## 2022-07-19 DIAGNOSIS — K21.00: ICD-10-CM

## 2022-07-19 DIAGNOSIS — D69.6: ICD-10-CM

## 2022-07-19 DIAGNOSIS — K70.30: ICD-10-CM

## 2022-07-19 DIAGNOSIS — K92.0: ICD-10-CM

## 2022-07-19 DIAGNOSIS — D64.9: ICD-10-CM

## 2022-07-19 DIAGNOSIS — N30.01: ICD-10-CM

## 2022-07-19 DIAGNOSIS — K29.71: ICD-10-CM

## 2022-07-19 DIAGNOSIS — K44.9: Primary | ICD-10-CM

## 2022-07-19 DIAGNOSIS — K85.02: ICD-10-CM

## 2022-07-19 LAB
ALBUMIN SERPL-MCNC: 3 GM/DL (ref 3.2–4.5)
ALP SERPL-CCNC: 133 U/L (ref 40–136)
ALT SERPL-CCNC: 43 U/L (ref 0–55)
APTT BLD: 38 SEC (ref 24–35)
APTT PPP: YELLOW S
BACTERIA #/AREA URNS HPF: (no result) /HPF
BARBITURATES UR QL: NEGATIVE
BASOPHILS # BLD AUTO: 0 10^3/UL (ref 0–0.1)
BASOPHILS NFR BLD AUTO: 1 % (ref 0–10)
BENZODIAZ UR QL SCN: NEGATIVE
BILIRUB SERPL-MCNC: 2.2 MG/DL (ref 0.1–1)
BILIRUB UR QL STRIP: (no result)
BUN/CREAT SERPL: 11
CALCIUM SERPL-MCNC: 8.3 MG/DL (ref 8.5–10.1)
CAOX CRY #/AREA URNS LPF: (no result) /LPF
CHLORIDE SERPL-SCNC: 104 MMOL/L (ref 98–107)
CO2 SERPL-SCNC: 17 MMOL/L (ref 21–32)
COCAINE UR QL: NEGATIVE
CREAT SERPL-MCNC: 0.75 MG/DL (ref 0.6–1.3)
EOSINOPHIL # BLD AUTO: 0.1 10^3/UL (ref 0–0.3)
EOSINOPHIL NFR BLD AUTO: 2 % (ref 0–10)
FIBRINOGEN PPP-MCNC: (no result) MG/DL
GFR SERPLBLD BASED ON 1.73 SQ M-ARVRAT: 93 ML/MIN
GLUCOSE SERPL-MCNC: 129 MG/DL (ref 70–105)
GLUCOSE UR STRIP-MCNC: NEGATIVE MG/DL
HCT VFR BLD CALC: 28 % (ref 35–52)
HGB BLD-MCNC: 8.2 G/DL (ref 11.5–16)
INR PPP: 1.4 (ref 0.8–1.4)
KETONES UR QL STRIP: (no result)
LEUKOCYTE ESTERASE UR QL STRIP: (no result)
LYMPHOCYTES # BLD AUTO: 0.4 10^3/UL (ref 1–4)
LYMPHOCYTES NFR BLD AUTO: 10 % (ref 12–44)
MANUAL DIFFERENTIAL PERFORMED BLD QL: NO
MCH RBC QN AUTO: 25 PG (ref 25–34)
MCHC RBC AUTO-ENTMCNC: 30 G/DL (ref 32–36)
MCV RBC AUTO: 85 FL (ref 80–99)
METHADONE UR QL SCN: NEGATIVE
MONOCYTES # BLD AUTO: 0.3 10^3/UL (ref 0–1)
MONOCYTES NFR BLD AUTO: 6 % (ref 0–12)
NEUTROPHILS # BLD AUTO: 3.5 10^3/UL (ref 1.8–7.8)
NEUTROPHILS NFR BLD AUTO: 82 % (ref 42–75)
NITRITE UR QL STRIP: POSITIVE
OPIATES UR QL SCN: NEGATIVE
OXYCODONE UR QL: NEGATIVE
PH UR STRIP: 6 [PH] (ref 5–9)
PLATELET # BLD: 46 10^3/UL (ref 130–400)
PMV BLD AUTO: (no result) FL (ref 9–12.2)
POTASSIUM SERPL-SCNC: 3.2 MMOL/L (ref 3.6–5)
PROPOXYPH UR QL: NEGATIVE
PROT SERPL-MCNC: 6.7 GM/DL (ref 6.4–8.2)
PROT UR QL STRIP: (no result)
PROTHROMBIN TIME: 18 SEC (ref 12.2–14.7)
RBC #/AREA URNS HPF: (no result) /HPF
RENAL EPI CELLS #/AREA URNS HPF: (no result) /HPF
SODIUM SERPL-SCNC: 136 MMOL/L (ref 135–145)
SP GR UR STRIP: 1.02 (ref 1.02–1.02)
TRICYCLICS UR QL SCN: NEGATIVE
WBC # BLD AUTO: 4.2 10^3/UL (ref 4.3–11)
WBC #/AREA URNS HPF: (no result) /HPF

## 2022-07-19 PROCEDURE — 83605 ASSAY OF LACTIC ACID: CPT

## 2022-07-19 PROCEDURE — 85610 PROTHROMBIN TIME: CPT

## 2022-07-19 PROCEDURE — 83690 ASSAY OF LIPASE: CPT

## 2022-07-19 PROCEDURE — 71045 X-RAY EXAM CHEST 1 VIEW: CPT

## 2022-07-19 PROCEDURE — 70450 CT HEAD/BRAIN W/O DYE: CPT

## 2022-07-19 PROCEDURE — 87186 SC STD MICRODIL/AGAR DIL: CPT

## 2022-07-19 PROCEDURE — 93005 ELECTROCARDIOGRAM TRACING: CPT

## 2022-07-19 PROCEDURE — 36430 TRANSFUSION BLD/BLD COMPNT: CPT

## 2022-07-19 PROCEDURE — 87077 CULTURE AEROBIC IDENTIFY: CPT

## 2022-07-19 PROCEDURE — 82728 ASSAY OF FERRITIN: CPT

## 2022-07-19 PROCEDURE — 85730 THROMBOPLASTIN TIME PARTIAL: CPT

## 2022-07-19 PROCEDURE — 74177 CT ABD & PELVIS W/CONTRAST: CPT

## 2022-07-19 PROCEDURE — 72125 CT NECK SPINE W/O DYE: CPT

## 2022-07-19 PROCEDURE — 80320 DRUG SCREEN QUANTALCOHOLS: CPT

## 2022-07-19 PROCEDURE — 84484 ASSAY OF TROPONIN QUANT: CPT

## 2022-07-19 PROCEDURE — 83540 ASSAY OF IRON: CPT

## 2022-07-19 PROCEDURE — 83550 IRON BINDING TEST: CPT

## 2022-07-19 PROCEDURE — 87040 BLOOD CULTURE FOR BACTERIA: CPT

## 2022-07-19 PROCEDURE — 80053 COMPREHEN METABOLIC PANEL: CPT

## 2022-07-19 PROCEDURE — 80306 DRUG TEST PRSMV INSTRMNT: CPT

## 2022-07-19 PROCEDURE — 36415 COLL VENOUS BLD VENIPUNCTURE: CPT

## 2022-07-19 PROCEDURE — 87088 URINE BACTERIA CULTURE: CPT

## 2022-07-19 PROCEDURE — 80048 BASIC METABOLIC PNL TOTAL CA: CPT

## 2022-07-19 PROCEDURE — 81000 URINALYSIS NONAUTO W/SCOPE: CPT

## 2022-07-19 PROCEDURE — 85025 COMPLETE CBC W/AUTO DIFF WBC: CPT

## 2022-07-19 RX ADMIN — ONDANSETRON PRN MG: 2 INJECTION, SOLUTION INTRAMUSCULAR; INTRAVENOUS at 22:54

## 2022-07-20 VITALS — DIASTOLIC BLOOD PRESSURE: 72 MMHG | SYSTOLIC BLOOD PRESSURE: 113 MMHG

## 2022-07-20 VITALS — DIASTOLIC BLOOD PRESSURE: 61 MMHG | SYSTOLIC BLOOD PRESSURE: 105 MMHG

## 2022-07-20 VITALS — SYSTOLIC BLOOD PRESSURE: 99 MMHG | DIASTOLIC BLOOD PRESSURE: 54 MMHG

## 2022-07-20 VITALS — SYSTOLIC BLOOD PRESSURE: 109 MMHG | DIASTOLIC BLOOD PRESSURE: 55 MMHG

## 2022-07-20 VITALS — DIASTOLIC BLOOD PRESSURE: 75 MMHG | SYSTOLIC BLOOD PRESSURE: 119 MMHG

## 2022-07-20 VITALS — SYSTOLIC BLOOD PRESSURE: 108 MMHG | DIASTOLIC BLOOD PRESSURE: 66 MMHG

## 2022-07-20 LAB
BASOPHILS # BLD AUTO: 0 10^3/UL (ref 0–0.1)
BASOPHILS NFR BLD AUTO: 1 % (ref 0–10)
BUN/CREAT SERPL: 8
CALCIUM SERPL-MCNC: 7.1 MG/DL (ref 8.5–10.1)
CHLORIDE SERPL-SCNC: 109 MMOL/L (ref 98–107)
CO2 SERPL-SCNC: 17 MMOL/L (ref 21–32)
CREAT SERPL-MCNC: 0.59 MG/DL (ref 0.6–1.3)
EOSINOPHIL # BLD AUTO: 0.1 10^3/UL (ref 0–0.3)
EOSINOPHIL NFR BLD AUTO: 5 % (ref 0–10)
GFR SERPLBLD BASED ON 1.73 SQ M-ARVRAT: 106 ML/MIN
GLUCOSE SERPL-MCNC: 83 MG/DL (ref 70–105)
HCT VFR BLD CALC: 25 % (ref 35–52)
HGB BLD-MCNC: 7.5 G/DL (ref 11.5–16)
LYMPHOCYTES # BLD AUTO: 0.6 10^3/UL (ref 1–4)
LYMPHOCYTES NFR BLD AUTO: 25 % (ref 12–44)
MANUAL DIFFERENTIAL PERFORMED BLD QL: NO
MCH RBC QN AUTO: 25 PG (ref 25–34)
MCHC RBC AUTO-ENTMCNC: 30 G/DL (ref 32–36)
MCV RBC AUTO: 86 FL (ref 80–99)
MONOCYTES # BLD AUTO: 0.2 10^3/UL (ref 0–1)
MONOCYTES NFR BLD AUTO: 10 % (ref 0–12)
NEUTROPHILS # BLD AUTO: 1.5 10^3/UL (ref 1.8–7.8)
NEUTROPHILS NFR BLD AUTO: 60 % (ref 42–75)
PLATELET # BLD: 30 10^3/UL (ref 130–400)
POTASSIUM SERPL-SCNC: 3.3 MMOL/L (ref 3.6–5)
SODIUM SERPL-SCNC: 137 MMOL/L (ref 135–145)
WBC # BLD AUTO: 2.5 10^3/UL (ref 4.3–11)

## 2022-07-20 RX ADMIN — ONDANSETRON PRN MG: 2 INJECTION, SOLUTION INTRAMUSCULAR; INTRAVENOUS at 18:05

## 2022-07-20 RX ADMIN — PANTOPRAZOLE SODIUM SCH MG: 40 INJECTION, POWDER, FOR SOLUTION INTRAVENOUS at 21:17

## 2022-07-20 RX ADMIN — FENTANYL CITRATE PRN MCG: 50 INJECTION, SOLUTION INTRAMUSCULAR; INTRAVENOUS at 08:29

## 2022-07-20 RX ADMIN — FENTANYL CITRATE PRN MCG: 50 INJECTION, SOLUTION INTRAMUSCULAR; INTRAVENOUS at 18:05

## 2022-07-20 RX ADMIN — PANTOPRAZOLE SODIUM SCH MG: 40 INJECTION, POWDER, FOR SOLUTION INTRAVENOUS at 08:31

## 2022-07-20 RX ADMIN — ONDANSETRON PRN MG: 2 INJECTION, SOLUTION INTRAMUSCULAR; INTRAVENOUS at 08:31

## 2022-07-21 VITALS — DIASTOLIC BLOOD PRESSURE: 74 MMHG | SYSTOLIC BLOOD PRESSURE: 124 MMHG

## 2022-07-21 VITALS — DIASTOLIC BLOOD PRESSURE: 60 MMHG | SYSTOLIC BLOOD PRESSURE: 119 MMHG

## 2022-07-21 VITALS — SYSTOLIC BLOOD PRESSURE: 87 MMHG | DIASTOLIC BLOOD PRESSURE: 49 MMHG

## 2022-07-21 VITALS — DIASTOLIC BLOOD PRESSURE: 58 MMHG | SYSTOLIC BLOOD PRESSURE: 114 MMHG

## 2022-07-21 VITALS — DIASTOLIC BLOOD PRESSURE: 50 MMHG | SYSTOLIC BLOOD PRESSURE: 84 MMHG

## 2022-07-21 VITALS — DIASTOLIC BLOOD PRESSURE: 63 MMHG | SYSTOLIC BLOOD PRESSURE: 104 MMHG

## 2022-07-21 VITALS — SYSTOLIC BLOOD PRESSURE: 93 MMHG | DIASTOLIC BLOOD PRESSURE: 50 MMHG

## 2022-07-21 VITALS — DIASTOLIC BLOOD PRESSURE: 49 MMHG | SYSTOLIC BLOOD PRESSURE: 83 MMHG

## 2022-07-21 VITALS — DIASTOLIC BLOOD PRESSURE: 72 MMHG | SYSTOLIC BLOOD PRESSURE: 121 MMHG

## 2022-07-21 LAB
ALBUMIN SERPL-MCNC: 2.5 GM/DL (ref 3.2–4.5)
ALP SERPL-CCNC: 95 U/L (ref 40–136)
ALT SERPL-CCNC: 31 U/L (ref 0–55)
BASOPHILS # BLD AUTO: 0 10^3/UL (ref 0–0.1)
BASOPHILS NFR BLD AUTO: 1 % (ref 0–10)
BILIRUB SERPL-MCNC: 1.6 MG/DL (ref 0.1–1)
BUN/CREAT SERPL: 5
CALCIUM SERPL-MCNC: 7.3 MG/DL (ref 8.5–10.1)
CHLORIDE SERPL-SCNC: 110 MMOL/L (ref 98–107)
CO2 SERPL-SCNC: 18 MMOL/L (ref 21–32)
CREAT SERPL-MCNC: 0.55 MG/DL (ref 0.6–1.3)
EOSINOPHIL # BLD AUTO: 0.1 10^3/UL (ref 0–0.3)
EOSINOPHIL NFR BLD AUTO: 4 % (ref 0–10)
GFR SERPLBLD BASED ON 1.73 SQ M-ARVRAT: 108 ML/MIN
GLUCOSE SERPL-MCNC: 90 MG/DL (ref 70–105)
HCT VFR BLD CALC: 25 % (ref 35–52)
HGB BLD-MCNC: 7.5 G/DL (ref 11.5–16)
LYMPHOCYTES # BLD AUTO: 0.4 10^3/UL (ref 1–4)
LYMPHOCYTES NFR BLD AUTO: 31 % (ref 12–44)
MANUAL DIFFERENTIAL PERFORMED BLD QL: NO
MCH RBC QN AUTO: 25 PG (ref 25–34)
MCHC RBC AUTO-ENTMCNC: 30 G/DL (ref 32–36)
MCV RBC AUTO: 86 FL (ref 80–99)
MONOCYTES # BLD AUTO: 0.2 10^3/UL (ref 0–1)
MONOCYTES NFR BLD AUTO: 13 % (ref 0–12)
NEUTROPHILS # BLD AUTO: 0.7 10^3/UL (ref 1.8–7.8)
NEUTROPHILS NFR BLD AUTO: 51 % (ref 42–75)
PLATELET # BLD: 28 10^3/UL (ref 130–400)
PMV BLD AUTO: 12.4 FL (ref 9–12.2)
POTASSIUM SERPL-SCNC: 3.3 MMOL/L (ref 3.6–5)
PROT SERPL-MCNC: 5.8 GM/DL (ref 6.4–8.2)
SODIUM SERPL-SCNC: 136 MMOL/L (ref 135–145)
WBC # BLD AUTO: 1.4 10^3/UL (ref 4.3–11)

## 2022-07-21 RX ADMIN — PANTOPRAZOLE SODIUM SCH MG: 40 INJECTION, POWDER, FOR SOLUTION INTRAVENOUS at 08:05

## 2022-08-09 ENCOUNTER — HOSPITAL ENCOUNTER (OUTPATIENT)
Dept: HOSPITAL 75 - LAB | Age: 57
End: 2022-08-09
Attending: NURSE PRACTITIONER
Payer: COMMERCIAL

## 2022-08-09 DIAGNOSIS — K70.30: Primary | ICD-10-CM

## 2022-08-09 LAB
ALBUMIN SERPL-MCNC: 3 GM/DL (ref 3.2–4.5)
ALP SERPL-CCNC: 61 U/L (ref 40–136)
ALT SERPL-CCNC: 36 U/L (ref 0–55)
BASOPHILS # BLD AUTO: 0 10^3/UL (ref 0–0.1)
BASOPHILS NFR BLD AUTO: 1 % (ref 0–10)
BILIRUB SERPL-MCNC: 1.1 MG/DL (ref 0.1–1)
BUN/CREAT SERPL: 9
CALCIUM SERPL-MCNC: 8.2 MG/DL (ref 8.5–10.1)
CHLORIDE SERPL-SCNC: 102 MMOL/L (ref 98–107)
CO2 SERPL-SCNC: 23 MMOL/L (ref 21–32)
CREAT SERPL-MCNC: 0.76 MG/DL (ref 0.6–1.3)
EOSINOPHIL # BLD AUTO: 0.1 10^3/UL (ref 0–0.3)
EOSINOPHIL NFR BLD AUTO: 4 % (ref 0–10)
GFR SERPLBLD BASED ON 1.73 SQ M-ARVRAT: 92 ML/MIN
GLUCOSE SERPL-MCNC: 109 MG/DL (ref 70–105)
HCT VFR BLD CALC: 26 % (ref 35–52)
HGB BLD-MCNC: 7.9 G/DL (ref 11.5–16)
INR PPP: 1.3 (ref 0.8–1.4)
LYMPHOCYTES # BLD AUTO: 0.7 10^3/UL (ref 1–4)
LYMPHOCYTES NFR BLD AUTO: 25 % (ref 12–44)
MANUAL DIFFERENTIAL PERFORMED BLD QL: NO
MCH RBC QN AUTO: 24 PG (ref 25–34)
MCHC RBC AUTO-ENTMCNC: 30 G/DL (ref 32–36)
MCV RBC AUTO: 81 FL (ref 80–99)
MONOCYTES # BLD AUTO: 0.3 10^3/UL (ref 0–1)
MONOCYTES NFR BLD AUTO: 10 % (ref 0–12)
NEUTROPHILS # BLD AUTO: 1.6 10^3/UL (ref 1.8–7.8)
NEUTROPHILS NFR BLD AUTO: 59 % (ref 42–75)
PLATELET # BLD: 111 10^3/UL (ref 130–400)
PMV BLD AUTO: 12 FL (ref 9–12.2)
POTASSIUM SERPL-SCNC: 3.9 MMOL/L (ref 3.6–5)
PROT SERPL-MCNC: 6.6 GM/DL (ref 6.4–8.2)
PROTHROMBIN TIME: 16.8 SEC (ref 12.2–14.7)
SODIUM SERPL-SCNC: 135 MMOL/L (ref 135–145)
WBC # BLD AUTO: 2.8 10^3/UL (ref 4.3–11)

## 2022-08-09 PROCEDURE — 36415 COLL VENOUS BLD VENIPUNCTURE: CPT

## 2022-08-09 PROCEDURE — 82306 VITAMIN D 25 HYDROXY: CPT

## 2022-08-09 PROCEDURE — 84590 ASSAY OF VITAMIN A: CPT

## 2022-08-09 PROCEDURE — 85610 PROTHROMBIN TIME: CPT

## 2022-08-09 PROCEDURE — 82105 ALPHA-FETOPROTEIN SERUM: CPT

## 2022-08-09 PROCEDURE — 80053 COMPREHEN METABOLIC PANEL: CPT

## 2022-08-09 PROCEDURE — 85025 COMPLETE CBC W/AUTO DIFF WBC: CPT

## 2022-08-23 ENCOUNTER — HOSPITAL ENCOUNTER (OUTPATIENT)
Dept: HOSPITAL 75 - RAD | Age: 57
End: 2022-08-23
Attending: INTERNAL MEDICINE
Payer: COMMERCIAL

## 2022-08-23 DIAGNOSIS — Z12.31: Primary | ICD-10-CM

## 2022-08-23 PROCEDURE — 77067 SCR MAMMO BI INCL CAD: CPT

## 2022-08-23 PROCEDURE — 77063 BREAST TOMOSYNTHESIS BI: CPT

## 2022-08-26 ENCOUNTER — HOSPITAL ENCOUNTER (EMERGENCY)
Dept: HOSPITAL 75 - ER | Age: 57
Discharge: HOME | End: 2022-08-26
Payer: COMMERCIAL

## 2022-08-26 VITALS — DIASTOLIC BLOOD PRESSURE: 63 MMHG | SYSTOLIC BLOOD PRESSURE: 112 MMHG
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
## 2022-08-26 DIAGNOSIS — K74.60: ICD-10-CM

## 2022-08-26 DIAGNOSIS — S06.0X9A: Primary | ICD-10-CM

## 2022-08-26 LAB
ALBUMIN SERPL-MCNC: 2.8 GM/DL (ref 3.2–4.5)
ALP SERPL-CCNC: 67 U/L (ref 40–136)
ALT SERPL-CCNC: 39 U/L (ref 0–55)
BARBITURATES UR QL: NEGATIVE
BASOPHILS # BLD AUTO: 0 10^3/UL (ref 0–0.1)
BASOPHILS NFR BLD AUTO: 0 % (ref 0–10)
BENZODIAZ UR QL SCN: NEGATIVE
BILIRUB SERPL-MCNC: 1.4 MG/DL (ref 0.1–1)
BUN/CREAT SERPL: 11
CALCIUM SERPL-MCNC: 7.4 MG/DL (ref 8.5–10.1)
CHLORIDE SERPL-SCNC: 104 MMOL/L (ref 98–107)
CO2 SERPL-SCNC: 14 MMOL/L (ref 21–32)
COCAINE UR QL: NEGATIVE
CREAT SERPL-MCNC: 0.66 MG/DL (ref 0.6–1.3)
EOSINOPHIL # BLD AUTO: 0 10^3/UL (ref 0–0.3)
EOSINOPHIL NFR BLD AUTO: 0 % (ref 0–10)
GFR SERPLBLD BASED ON 1.73 SQ M-ARVRAT: 103 ML/MIN
GLUCOSE SERPL-MCNC: 95 MG/DL (ref 70–105)
HCT VFR BLD CALC: 24 % (ref 35–52)
HGB BLD-MCNC: 7.4 G/DL (ref 11.5–16)
INR PPP: 1.5 (ref 0.8–1.4)
LYMPHOCYTES # BLD AUTO: 1.2 10^3/UL (ref 1–4)
LYMPHOCYTES NFR BLD AUTO: 16 % (ref 12–44)
MANUAL DIFFERENTIAL PERFORMED BLD QL: NO
MCH RBC QN AUTO: 23 PG (ref 25–34)
MCHC RBC AUTO-ENTMCNC: 32 G/DL (ref 32–36)
MCV RBC AUTO: 75 FL (ref 80–99)
METHADONE UR QL SCN: NEGATIVE
MONOCYTES # BLD AUTO: 0.6 10^3/UL (ref 0–1)
MONOCYTES NFR BLD AUTO: 8 % (ref 0–12)
NEUTROPHILS # BLD AUTO: 5.5 10^3/UL (ref 1.8–7.8)
NEUTROPHILS NFR BLD AUTO: 75 % (ref 42–75)
OPIATES UR QL SCN: NEGATIVE
OXYCODONE UR QL: NEGATIVE
PLATELET # BLD: 67 10^3/UL (ref 130–400)
PMV BLD AUTO: 11.9 FL (ref 9–12.2)
POTASSIUM SERPL-SCNC: 3.5 MMOL/L (ref 3.6–5)
PROPOXYPH UR QL: NEGATIVE
PROT SERPL-MCNC: 6.4 GM/DL (ref 6.4–8.2)
PROTHROMBIN TIME: 18.1 SEC (ref 12.2–14.7)
SODIUM SERPL-SCNC: 131 MMOL/L (ref 135–145)
TRICYCLICS UR QL SCN: NEGATIVE
WBC # BLD AUTO: 7.3 10^3/UL (ref 4.3–11)

## 2022-08-26 PROCEDURE — 85025 COMPLETE CBC W/AUTO DIFF WBC: CPT

## 2022-08-26 PROCEDURE — 82140 ASSAY OF AMMONIA: CPT

## 2022-08-26 PROCEDURE — 80306 DRUG TEST PRSMV INSTRMNT: CPT

## 2022-08-26 PROCEDURE — 36415 COLL VENOUS BLD VENIPUNCTURE: CPT

## 2022-08-26 PROCEDURE — 70450 CT HEAD/BRAIN W/O DYE: CPT

## 2022-08-26 PROCEDURE — 80320 DRUG SCREEN QUANTALCOHOLS: CPT

## 2022-08-26 PROCEDURE — 80053 COMPREHEN METABOLIC PANEL: CPT

## 2022-08-26 PROCEDURE — 82800 BLOOD PH: CPT

## 2022-08-26 PROCEDURE — 72125 CT NECK SPINE W/O DYE: CPT

## 2022-08-26 PROCEDURE — 85610 PROTHROMBIN TIME: CPT

## 2022-10-03 ENCOUNTER — HOSPITAL ENCOUNTER (OUTPATIENT)
Dept: HOSPITAL 75 - RAD | Age: 57
End: 2022-10-03
Attending: NURSE PRACTITIONER
Payer: COMMERCIAL

## 2022-10-03 DIAGNOSIS — K70.30: Primary | ICD-10-CM

## 2022-10-03 DIAGNOSIS — I86.8: ICD-10-CM

## 2022-10-03 DIAGNOSIS — K80.20: ICD-10-CM

## 2022-10-03 DIAGNOSIS — K76.6: ICD-10-CM

## 2022-10-03 DIAGNOSIS — R16.1: ICD-10-CM

## 2022-10-03 PROCEDURE — 76700 US EXAM ABDOM COMPLETE: CPT

## 2022-11-19 ENCOUNTER — HOSPITAL ENCOUNTER (EMERGENCY)
Dept: HOSPITAL 75 - ER | Age: 57
Discharge: ANOTHER HOSPITAL | End: 2022-11-19
Payer: COMMERCIAL

## 2022-11-19 VITALS — DIASTOLIC BLOOD PRESSURE: 68 MMHG | SYSTOLIC BLOOD PRESSURE: 122 MMHG

## 2022-11-19 VITALS — SYSTOLIC BLOOD PRESSURE: 122 MMHG | DIASTOLIC BLOOD PRESSURE: 68 MMHG

## 2022-11-19 VITALS — SYSTOLIC BLOOD PRESSURE: 119 MMHG | DIASTOLIC BLOOD PRESSURE: 53 MMHG

## 2022-11-19 VITALS — SYSTOLIC BLOOD PRESSURE: 122 MMHG | DIASTOLIC BLOOD PRESSURE: 53 MMHG

## 2022-11-19 DIAGNOSIS — K92.2: Primary | ICD-10-CM

## 2022-11-19 DIAGNOSIS — Z20.822: ICD-10-CM

## 2022-11-19 DIAGNOSIS — D69.6: ICD-10-CM

## 2022-11-19 DIAGNOSIS — D64.9: ICD-10-CM
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
## 2022-11-19 DIAGNOSIS — K70.30: ICD-10-CM

## 2022-11-19 LAB
ALBUMIN SERPL-MCNC: 2.3 GM/DL (ref 3.2–4.5)
ALP SERPL-CCNC: 206 U/L (ref 40–136)
ALT SERPL-CCNC: 45 U/L (ref 0–55)
BASOPHILS # BLD AUTO: 0 10^3/UL (ref 0–0.1)
BASOPHILS NFR BLD AUTO: 1 % (ref 0–10)
BILIRUB SERPL-MCNC: 1.5 MG/DL (ref 0.1–1)
BLD SMEAR INTERP: YES
BUN/CREAT SERPL: 10
CALCIUM SERPL-MCNC: 7 MG/DL (ref 8.5–10.1)
CHLORIDE SERPL-SCNC: 102 MMOL/L (ref 98–107)
CO2 SERPL-SCNC: 19 MMOL/L (ref 21–32)
CREAT SERPL-MCNC: 0.59 MG/DL (ref 0.6–1.3)
EOSINOPHIL # BLD AUTO: 0 10^3/UL (ref 0–0.3)
EOSINOPHIL NFR BLD AUTO: 0 % (ref 0–10)
GFR SERPLBLD BASED ON 1.73 SQ M-ARVRAT: 106 ML/MIN
GLUCOSE SERPL-MCNC: 110 MG/DL (ref 70–105)
HCT VFR BLD CALC: 23 % (ref 35–52)
HGB BLD-MCNC: 6.6 G/DL (ref 11.5–16)
INR PPP: 1.6 (ref 0.8–1.4)
LIPASE SERPL-CCNC: 190 U/L (ref 8–78)
LYMPHOCYTES # BLD AUTO: 0.6 10^3/UL (ref 1–4)
LYMPHOCYTES NFR BLD AUTO: 19 % (ref 12–44)
MANUAL DIFFERENTIAL PERFORMED BLD QL: NO
MCH RBC QN AUTO: 24 PG (ref 25–34)
MCHC RBC AUTO-ENTMCNC: 28 G/DL (ref 32–36)
MCV RBC AUTO: 83 FL (ref 80–99)
MONOCYTES # BLD AUTO: 0.3 10^3/UL (ref 0–1)
MONOCYTES NFR BLD AUTO: 8 % (ref 0–12)
NEUTROPHILS # BLD AUTO: 2.3 10^3/UL (ref 1.8–7.8)
NEUTROPHILS NFR BLD AUTO: 71 % (ref 42–75)
PLATELET # BLD: 11 10^3/UL (ref 130–400)
POTASSIUM SERPL-SCNC: 3.5 MMOL/L (ref 3.6–5)
PROT SERPL-MCNC: 5.9 GM/DL (ref 6.4–8.2)
PROTHROMBIN TIME: 19.3 SEC (ref 12.2–14.7)
SODIUM SERPL-SCNC: 136 MMOL/L (ref 135–145)
WBC # BLD AUTO: 3.2 10^3/UL (ref 4.3–11)

## 2022-11-19 PROCEDURE — 86900 BLOOD TYPING SEROLOGIC ABO: CPT

## 2022-11-19 PROCEDURE — 80320 DRUG SCREEN QUANTALCOHOLS: CPT

## 2022-11-19 PROCEDURE — 80053 COMPREHEN METABOLIC PANEL: CPT

## 2022-11-19 PROCEDURE — 85025 COMPLETE CBC W/AUTO DIFF WBC: CPT

## 2022-11-19 PROCEDURE — 83605 ASSAY OF LACTIC ACID: CPT

## 2022-11-19 PROCEDURE — 83690 ASSAY OF LIPASE: CPT

## 2022-11-19 PROCEDURE — 86850 RBC ANTIBODY SCREEN: CPT

## 2022-11-19 PROCEDURE — 86901 BLOOD TYPING SEROLOGIC RH(D): CPT

## 2022-11-19 PROCEDURE — 87040 BLOOD CULTURE FOR BACTERIA: CPT

## 2022-11-19 PROCEDURE — 86920 COMPATIBILITY TEST SPIN: CPT

## 2022-11-19 PROCEDURE — 85610 PROTHROMBIN TIME: CPT

## 2022-11-19 PROCEDURE — 87636 SARSCOV2 & INF A&B AMP PRB: CPT

## 2022-11-19 PROCEDURE — 36415 COLL VENOUS BLD VENIPUNCTURE: CPT

## 2023-03-27 ENCOUNTER — HOSPITAL ENCOUNTER (OUTPATIENT)
Dept: HOSPITAL 75 - RAD | Age: 58
End: 2023-03-27
Attending: NURSE PRACTITIONER
Payer: COMMERCIAL

## 2023-03-27 DIAGNOSIS — R16.1: ICD-10-CM

## 2023-03-27 DIAGNOSIS — K80.20: ICD-10-CM

## 2023-03-27 DIAGNOSIS — K76.6: ICD-10-CM

## 2023-03-27 DIAGNOSIS — K70.30: Primary | ICD-10-CM

## 2023-03-27 PROCEDURE — 76700 US EXAM ABDOM COMPLETE: CPT

## 2023-05-01 ENCOUNTER — HOSPITAL ENCOUNTER (INPATIENT)
Dept: HOSPITAL 75 - ER | Age: 58
LOS: 4 days | Discharge: HOME HEALTH | DRG: 871 | End: 2023-05-05
Attending: INTERNAL MEDICINE | Admitting: INTERNAL MEDICINE
Payer: MEDICARE

## 2023-05-01 VITALS — SYSTOLIC BLOOD PRESSURE: 117 MMHG | DIASTOLIC BLOOD PRESSURE: 73 MMHG

## 2023-05-01 VITALS — BODY MASS INDEX: 21.05 KG/M2 | WEIGHT: 129.41 LBS | HEIGHT: 65.75 IN

## 2023-05-01 VITALS — SYSTOLIC BLOOD PRESSURE: 101 MMHG | DIASTOLIC BLOOD PRESSURE: 68 MMHG

## 2023-05-01 VITALS — SYSTOLIC BLOOD PRESSURE: 110 MMHG | DIASTOLIC BLOOD PRESSURE: 64 MMHG

## 2023-05-01 VITALS — DIASTOLIC BLOOD PRESSURE: 67 MMHG | SYSTOLIC BLOOD PRESSURE: 117 MMHG

## 2023-05-01 VITALS — DIASTOLIC BLOOD PRESSURE: 65 MMHG | SYSTOLIC BLOOD PRESSURE: 109 MMHG

## 2023-05-01 VITALS — SYSTOLIC BLOOD PRESSURE: 113 MMHG | DIASTOLIC BLOOD PRESSURE: 64 MMHG

## 2023-05-01 VITALS — DIASTOLIC BLOOD PRESSURE: 75 MMHG | SYSTOLIC BLOOD PRESSURE: 115 MMHG

## 2023-05-01 DIAGNOSIS — J18.9: ICD-10-CM

## 2023-05-01 DIAGNOSIS — Z20.822: ICD-10-CM

## 2023-05-01 DIAGNOSIS — F10.229: ICD-10-CM

## 2023-05-01 DIAGNOSIS — F17.210: ICD-10-CM

## 2023-05-01 DIAGNOSIS — D84.821: ICD-10-CM

## 2023-05-01 DIAGNOSIS — R65.20: ICD-10-CM

## 2023-05-01 DIAGNOSIS — D69.6: ICD-10-CM

## 2023-05-01 DIAGNOSIS — A41.9: Primary | ICD-10-CM

## 2023-05-01 DIAGNOSIS — K70.30: ICD-10-CM

## 2023-05-01 DIAGNOSIS — D64.9: ICD-10-CM

## 2023-05-01 DIAGNOSIS — L40.9: ICD-10-CM

## 2023-05-01 DIAGNOSIS — G43.909: ICD-10-CM

## 2023-05-01 DIAGNOSIS — M81.0: ICD-10-CM

## 2023-05-01 DIAGNOSIS — F17.290: ICD-10-CM

## 2023-05-01 DIAGNOSIS — K21.9: ICD-10-CM

## 2023-05-01 DIAGNOSIS — K92.2: ICD-10-CM

## 2023-05-01 DIAGNOSIS — M19.90: ICD-10-CM

## 2023-05-01 DIAGNOSIS — I10: ICD-10-CM

## 2023-05-01 DIAGNOSIS — Z79.899: ICD-10-CM
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
## 2023-05-01 DIAGNOSIS — J96.01: ICD-10-CM

## 2023-05-01 LAB
ALBUMIN SERPL-MCNC: 3 GM/DL (ref 3.2–4.5)
ALP SERPL-CCNC: 120 U/L (ref 40–136)
ALT SERPL-CCNC: 35 U/L (ref 0–55)
APTT BLD: 44 SEC (ref 24–35)
APTT PPP: YELLOW S
BACTERIA #/AREA URNS HPF: NEGATIVE /HPF
BARBITURATES UR QL: NEGATIVE
BASOPHILS # BLD AUTO: 0.1 10^3/UL (ref 0–0.1)
BASOPHILS NFR BLD AUTO: 1 % (ref 0–10)
BENZODIAZ UR QL SCN: NEGATIVE
BILIRUB SERPL-MCNC: 0.9 MG/DL (ref 0.1–1)
BILIRUB UR QL STRIP: NEGATIVE
BUN/CREAT SERPL: 8
CALCIUM SERPL-MCNC: 8.6 MG/DL (ref 8.5–10.1)
CHLORIDE SERPL-SCNC: 107 MMOL/L (ref 98–107)
CO2 SERPL-SCNC: 21 MMOL/L (ref 21–32)
COCAINE UR QL: NEGATIVE
CREAT SERPL-MCNC: 0.73 MG/DL (ref 0.6–1.3)
D DIMER PPP FEU-MCNC: 2.78 UG/ML (ref 0–0.49)
EOSINOPHIL # BLD AUTO: 0 10^3/UL (ref 0–0.3)
EOSINOPHIL NFR BLD AUTO: 1 % (ref 0–10)
FIBRINOGEN PPP-MCNC: CLEAR MG/DL
GFR SERPLBLD BASED ON 1.73 SQ M-ARVRAT: 96 ML/MIN
GLUCOSE SERPL-MCNC: 99 MG/DL (ref 70–105)
GLUCOSE UR STRIP-MCNC: NEGATIVE MG/DL
HCT VFR BLD CALC: 45 % (ref 35–52)
HGB BLD-MCNC: 14.8 G/DL (ref 11.5–16)
INR PPP: 1.3 (ref 0.8–1.4)
KETONES UR QL STRIP: NEGATIVE
LEUKOCYTE ESTERASE UR QL STRIP: NEGATIVE
LYMPHOCYTES # BLD AUTO: 1.5 10^3/UL (ref 1–4)
LYMPHOCYTES NFR BLD AUTO: 35 % (ref 12–44)
MANUAL DIFFERENTIAL PERFORMED BLD QL: NO
MCH RBC QN AUTO: 29 PG (ref 25–34)
MCHC RBC AUTO-ENTMCNC: 33 G/DL (ref 32–36)
MCV RBC AUTO: 89 FL (ref 80–99)
METHADONE UR QL SCN: NEGATIVE
MONOCYTES # BLD AUTO: 0.4 10^3/UL (ref 0–1)
MONOCYTES NFR BLD AUTO: 10 % (ref 0–12)
NEUTROPHILS # BLD AUTO: 2.3 10^3/UL (ref 1.8–7.8)
NEUTROPHILS NFR BLD AUTO: 54 % (ref 42–75)
NITRITE UR QL STRIP: NEGATIVE
OPIATES UR QL SCN: NEGATIVE
OXYCODONE UR QL: NEGATIVE
PH UR STRIP: 7 [PH] (ref 5–9)
PLATELET # BLD: 69 10^3/UL (ref 130–400)
PMV BLD AUTO: (no result) FL (ref 9–12.2)
POTASSIUM SERPL-SCNC: 4.8 MMOL/L (ref 3.6–5)
PROPOXYPH UR QL: NEGATIVE
PROT SERPL-MCNC: 8.1 GM/DL (ref 6.4–8.2)
PROT UR QL STRIP: NEGATIVE
PROTHROMBIN TIME: 16.5 SEC (ref 12.2–14.7)
RBC #/AREA URNS HPF: (no result) /HPF
SODIUM SERPL-SCNC: 140 MMOL/L (ref 135–145)
SP GR UR STRIP: <=1.005 (ref 1.02–1.02)
TRICYCLICS UR QL SCN: NEGATIVE
WBC # BLD AUTO: 4.3 10^3/UL (ref 4.3–11)
WBC #/AREA URNS HPF: (no result) /HPF

## 2023-05-01 PROCEDURE — 94664 DEMO&/EVAL PT USE INHALER: CPT

## 2023-05-01 PROCEDURE — 94760 N-INVAS EAR/PLS OXIMETRY 1: CPT

## 2023-05-01 PROCEDURE — 83605 ASSAY OF LACTIC ACID: CPT

## 2023-05-01 PROCEDURE — 83735 ASSAY OF MAGNESIUM: CPT

## 2023-05-01 PROCEDURE — 87081 CULTURE SCREEN ONLY: CPT

## 2023-05-01 PROCEDURE — 87040 BLOOD CULTURE FOR BACTERIA: CPT

## 2023-05-01 PROCEDURE — 87636 SARSCOV2 & INF A&B AMP PRB: CPT

## 2023-05-01 PROCEDURE — 80306 DRUG TEST PRSMV INSTRMNT: CPT

## 2023-05-01 PROCEDURE — 80320 DRUG SCREEN QUANTALCOHOLS: CPT

## 2023-05-01 PROCEDURE — 93041 RHYTHM ECG TRACING: CPT

## 2023-05-01 PROCEDURE — 36415 COLL VENOUS BLD VENIPUNCTURE: CPT

## 2023-05-01 PROCEDURE — 81000 URINALYSIS NONAUTO W/SCOPE: CPT

## 2023-05-01 PROCEDURE — 85730 THROMBOPLASTIN TIME PARTIAL: CPT

## 2023-05-01 PROCEDURE — 71045 X-RAY EXAM CHEST 1 VIEW: CPT

## 2023-05-01 PROCEDURE — 85610 PROTHROMBIN TIME: CPT

## 2023-05-01 PROCEDURE — 93005 ELECTROCARDIOGRAM TRACING: CPT

## 2023-05-01 PROCEDURE — 84100 ASSAY OF PHOSPHORUS: CPT

## 2023-05-01 PROCEDURE — 71275 CT ANGIOGRAPHY CHEST: CPT

## 2023-05-01 PROCEDURE — 94640 AIRWAY INHALATION TREATMENT: CPT

## 2023-05-01 PROCEDURE — 84484 ASSAY OF TROPONIN QUANT: CPT

## 2023-05-01 PROCEDURE — 94761 N-INVAS EAR/PLS OXIMETRY MLT: CPT

## 2023-05-01 PROCEDURE — 85379 FIBRIN DEGRADATION QUANT: CPT

## 2023-05-01 PROCEDURE — 82947 ASSAY GLUCOSE BLOOD QUANT: CPT

## 2023-05-01 PROCEDURE — 85025 COMPLETE CBC W/AUTO DIFF WBC: CPT

## 2023-05-01 PROCEDURE — 87389 HIV-1 AG W/HIV-1&-2 AB AG IA: CPT

## 2023-05-01 PROCEDURE — 80053 COMPREHEN METABOLIC PANEL: CPT

## 2023-05-01 RX ADMIN — SENNOSIDES SCH MG: 8.6 TABLET, FILM COATED ORAL at 23:05

## 2023-05-01 RX ADMIN — Medication SCH MG: at 23:05

## 2023-05-01 RX ADMIN — BENZONATATE SCH MG: 100 CAPSULE ORAL at 23:05

## 2023-05-01 RX ADMIN — DOCUSATE SODIUM SCH MG: 100 CAPSULE ORAL at 23:05

## 2023-05-01 RX ADMIN — SODIUM CHLORIDE SCH MLS/HR: 900 INJECTION, SOLUTION INTRAVENOUS at 22:07

## 2023-05-01 RX ADMIN — ENOXAPARIN SODIUM SCH MG: 30 INJECTION SUBCUTANEOUS at 23:05

## 2023-05-02 VITALS — SYSTOLIC BLOOD PRESSURE: 121 MMHG | DIASTOLIC BLOOD PRESSURE: 70 MMHG

## 2023-05-02 VITALS — SYSTOLIC BLOOD PRESSURE: 128 MMHG | DIASTOLIC BLOOD PRESSURE: 71 MMHG

## 2023-05-02 VITALS — DIASTOLIC BLOOD PRESSURE: 68 MMHG | SYSTOLIC BLOOD PRESSURE: 120 MMHG

## 2023-05-02 VITALS — DIASTOLIC BLOOD PRESSURE: 71 MMHG | SYSTOLIC BLOOD PRESSURE: 123 MMHG

## 2023-05-02 VITALS — DIASTOLIC BLOOD PRESSURE: 69 MMHG | SYSTOLIC BLOOD PRESSURE: 126 MMHG

## 2023-05-02 VITALS — DIASTOLIC BLOOD PRESSURE: 65 MMHG | SYSTOLIC BLOOD PRESSURE: 123 MMHG

## 2023-05-02 VITALS — DIASTOLIC BLOOD PRESSURE: 72 MMHG | SYSTOLIC BLOOD PRESSURE: 129 MMHG

## 2023-05-02 VITALS — SYSTOLIC BLOOD PRESSURE: 122 MMHG | DIASTOLIC BLOOD PRESSURE: 73 MMHG

## 2023-05-02 LAB
ALBUMIN SERPL-MCNC: 2.2 GM/DL (ref 3.2–4.5)
ALBUMIN SERPL-MCNC: 2.3 GM/DL (ref 3.2–4.5)
ALP SERPL-CCNC: 100 U/L (ref 40–136)
ALP SERPL-CCNC: 96 U/L (ref 40–136)
ALT SERPL-CCNC: 26 U/L (ref 0–55)
ALT SERPL-CCNC: 27 U/L (ref 0–55)
BASOPHILS # BLD AUTO: 0 10^3/UL (ref 0–0.1)
BASOPHILS NFR BLD AUTO: 1 % (ref 0–10)
BILIRUB SERPL-MCNC: 0.8 MG/DL (ref 0.1–1)
BILIRUB SERPL-MCNC: 0.8 MG/DL (ref 0.1–1)
BUN/CREAT SERPL: 11
BUN/CREAT SERPL: 11
CALCIUM SERPL-MCNC: 7.1 MG/DL (ref 8.5–10.1)
CALCIUM SERPL-MCNC: 7.1 MG/DL (ref 8.5–10.1)
CHLORIDE SERPL-SCNC: 111 MMOL/L (ref 98–107)
CHLORIDE SERPL-SCNC: 111 MMOL/L (ref 98–107)
CO2 SERPL-SCNC: 19 MMOL/L (ref 21–32)
CO2 SERPL-SCNC: 20 MMOL/L (ref 21–32)
CREAT SERPL-MCNC: 0.55 MG/DL (ref 0.6–1.3)
CREAT SERPL-MCNC: 0.56 MG/DL (ref 0.6–1.3)
EOSINOPHIL # BLD AUTO: 0 10^3/UL (ref 0–0.3)
EOSINOPHIL NFR BLD AUTO: 1 % (ref 0–10)
GFR SERPLBLD BASED ON 1.73 SQ M-ARVRAT: 107 ML/MIN
GFR SERPLBLD BASED ON 1.73 SQ M-ARVRAT: 108 ML/MIN
GLUCOSE SERPL-MCNC: 85 MG/DL (ref 70–105)
GLUCOSE SERPL-MCNC: 91 MG/DL (ref 70–105)
HCT VFR BLD CALC: 37 % (ref 35–52)
HGB BLD-MCNC: 11.9 G/DL (ref 11.5–16)
LYMPHOCYTES # BLD AUTO: 1 10^3/UL (ref 1–4)
LYMPHOCYTES NFR BLD AUTO: 31 % (ref 12–44)
MAGNESIUM SERPL-MCNC: 1.4 MG/DL (ref 1.6–2.4)
MANUAL DIFFERENTIAL PERFORMED BLD QL: NO
MCH RBC QN AUTO: 29 PG (ref 25–34)
MCHC RBC AUTO-ENTMCNC: 32 G/DL (ref 32–36)
MCV RBC AUTO: 90 FL (ref 80–99)
MONOCYTES # BLD AUTO: 0.4 10^3/UL (ref 0–1)
MONOCYTES NFR BLD AUTO: 12 % (ref 0–12)
NEUTROPHILS # BLD AUTO: 1.8 10^3/UL (ref 1.8–7.8)
NEUTROPHILS NFR BLD AUTO: 55 % (ref 42–75)
PHOSPHATE SERPL-MCNC: 3.1 MG/DL (ref 2.3–4.7)
PLATELET # BLD: 51 10^3/UL (ref 130–400)
PMV BLD AUTO: (no result) FL (ref 9–12.2)
POTASSIUM SERPL-SCNC: 3.7 MMOL/L (ref 3.6–5)
POTASSIUM SERPL-SCNC: 3.8 MMOL/L (ref 3.6–5)
PROT SERPL-MCNC: 5.6 GM/DL (ref 6.4–8.2)
PROT SERPL-MCNC: 5.6 GM/DL (ref 6.4–8.2)
SODIUM SERPL-SCNC: 140 MMOL/L (ref 135–145)
SODIUM SERPL-SCNC: 142 MMOL/L (ref 135–145)
WBC # BLD AUTO: 3.3 10^3/UL (ref 4.3–11)

## 2023-05-02 RX ADMIN — POTASSIUM CHLORIDE SCH MLS/HR: 200 INJECTION, SOLUTION INTRAVENOUS at 03:54

## 2023-05-02 RX ADMIN — LORAZEPAM PRN MG: 1 TABLET ORAL at 23:14

## 2023-05-02 RX ADMIN — MAGNESIUM SULFATE IN DEXTROSE SCH MLS/HR: 10 INJECTION, SOLUTION INTRAVENOUS at 06:20

## 2023-05-02 RX ADMIN — POTASSIUM CHLORIDE SCH MEQ: 1500 TABLET, EXTENDED RELEASE ORAL at 03:57

## 2023-05-02 RX ADMIN — SODIUM CHLORIDE SCH MLS/HR: 900 INJECTION, SOLUTION INTRAVENOUS at 07:00

## 2023-05-02 RX ADMIN — Medication SCH MG: at 09:00

## 2023-05-02 RX ADMIN — FOLIC ACID SCH MG: 1 TABLET ORAL at 18:12

## 2023-05-02 RX ADMIN — POTASSIUM CHLORIDE SCH MEQ: 1500 TABLET, EXTENDED RELEASE ORAL at 03:54

## 2023-05-02 RX ADMIN — DOCUSATE SODIUM SCH MG: 100 CAPSULE ORAL at 09:00

## 2023-05-02 RX ADMIN — MAGNESIUM SULFATE IN DEXTROSE SCH MLS/HR: 10 INJECTION, SOLUTION INTRAVENOUS at 07:15

## 2023-05-02 RX ADMIN — SODIUM CHLORIDE SCH MLS/HR: 900 INJECTION INTRAVENOUS at 04:18

## 2023-05-02 RX ADMIN — MAGNESIUM SULFATE IN DEXTROSE SCH MLS/HR: 10 INJECTION, SOLUTION INTRAVENOUS at 09:50

## 2023-05-02 RX ADMIN — Medication SCH EA: at 07:00

## 2023-05-02 RX ADMIN — ASCORBIC ACID, VITAMIN A PALMITATE, CHOLECALCIFEROL, THIAMINE HYDROCHLORIDE, RIBOFLAVIN-5 PHOSPHATE SODIUM, PYRIDOXINE HYDROCHLORIDE, NIACINAMIDE, DEXPANTHENOL, ALPHA-TOCOPHEROL ACETATE, VITAMIN K1, FOLIC ACID, BIOTIN, CYANOCOBALAMIN SCH MLS/HR: 200; 3300; 200; 6; 3.6; 6; 40; 15; 10; 150; 600; 60; 5 INJECTION, SOLUTION INTRAVENOUS at 09:00

## 2023-05-02 RX ADMIN — ENOXAPARIN SODIUM SCH MG: 30 INJECTION SUBCUTANEOUS at 20:19

## 2023-05-02 RX ADMIN — MAGNESIUM SULFATE IN DEXTROSE SCH MLS/HR: 10 INJECTION, SOLUTION INTRAVENOUS at 04:18

## 2023-05-02 RX ADMIN — BENZONATATE SCH MG: 100 CAPSULE ORAL at 09:00

## 2023-05-02 RX ADMIN — SENNOSIDES SCH MG: 8.6 TABLET, FILM COATED ORAL at 09:00

## 2023-05-02 RX ADMIN — MAGNESIUM SULFATE IN DEXTROSE SCH MLS/HR: 10 INJECTION, SOLUTION INTRAVENOUS at 04:03

## 2023-05-02 RX ADMIN — BENZONATATE SCH MG: 100 CAPSULE ORAL at 13:00

## 2023-05-02 RX ADMIN — MAGNESIUM SULFATE IN DEXTROSE SCH MLS/HR: 10 INJECTION, SOLUTION INTRAVENOUS at 10:15

## 2023-05-02 RX ADMIN — POTASSIUM CHLORIDE SCH MLS/HR: 200 INJECTION, SOLUTION INTRAVENOUS at 03:56

## 2023-05-02 RX ADMIN — Medication SCH MG: at 20:18

## 2023-05-02 RX ADMIN — MAGNESIUM SULFATE IN DEXTROSE SCH MLS/HR: 10 INJECTION, SOLUTION INTRAVENOUS at 03:54

## 2023-05-02 RX ADMIN — Medication SCH MG: at 07:00

## 2023-05-02 RX ADMIN — SODIUM CHLORIDE SCH MLS/HR: 900 INJECTION INTRAVENOUS at 20:18

## 2023-05-02 RX ADMIN — POTASSIUM CHLORIDE SCH MEQ: 1500 TABLET, EXTENDED RELEASE ORAL at 07:00

## 2023-05-02 RX ADMIN — SENNOSIDES SCH MG: 8.6 TABLET, FILM COATED ORAL at 20:02

## 2023-05-02 RX ADMIN — SODIUM CHLORIDE SCH MLS/HR: 900 INJECTION, SOLUTION INTRAVENOUS at 18:24

## 2023-05-02 RX ADMIN — DOCUSATE SODIUM SCH MG: 100 CAPSULE ORAL at 20:02

## 2023-05-02 RX ADMIN — MAGNESIUM SULFATE IN DEXTROSE SCH MLS/HR: 10 INJECTION, SOLUTION INTRAVENOUS at 05:25

## 2023-05-02 RX ADMIN — GUAIFENESIN AND CODEINE PHOSPHATE PRN ML: 100; 10 SOLUTION ORAL at 23:13

## 2023-05-02 RX ADMIN — SODIUM CHLORIDE SCH MLS/HR: 900 INJECTION INTRAVENOUS at 12:00

## 2023-05-02 RX ADMIN — BENZONATATE SCH MG: 100 CAPSULE ORAL at 20:18

## 2023-05-03 LAB
ALBUMIN SERPL-MCNC: 2.1 GM/DL (ref 3.2–4.5)
ALP SERPL-CCNC: 103 U/L (ref 40–136)
ALT SERPL-CCNC: 23 U/L (ref 0–55)
BASOPHILS # BLD AUTO: 0 10^3/UL (ref 0–0.1)
BASOPHILS NFR BLD AUTO: 1 % (ref 0–10)
BILIRUB SERPL-MCNC: 1.3 MG/DL (ref 0.1–1)
BLD SMEAR INTERP: YES
BUN/CREAT SERPL: 11
CALCIUM SERPL-MCNC: 6.8 MG/DL (ref 8.5–10.1)
CHLORIDE SERPL-SCNC: 107 MMOL/L (ref 98–107)
CO2 SERPL-SCNC: 20 MMOL/L (ref 21–32)
CREAT SERPL-MCNC: 0.57 MG/DL (ref 0.6–1.3)
EOSINOPHIL # BLD AUTO: 0.1 10^3/UL (ref 0–0.3)
EOSINOPHIL NFR BLD AUTO: 2 % (ref 0–10)
GFR SERPLBLD BASED ON 1.73 SQ M-ARVRAT: 107 ML/MIN
GLUCOSE SERPL-MCNC: 81 MG/DL (ref 70–105)
HCT VFR BLD CALC: 38 % (ref 35–52)
HGB BLD-MCNC: 12.2 G/DL (ref 11.5–16)
LYMPHOCYTES # BLD AUTO: 0.7 10^3/UL (ref 1–4)
LYMPHOCYTES NFR BLD AUTO: 21 % (ref 12–44)
MAGNESIUM SERPL-MCNC: 2.4 MG/DL (ref 1.6–2.4)
MANUAL DIFFERENTIAL PERFORMED BLD QL: NO
MCH RBC QN AUTO: 29 PG (ref 25–34)
MCHC RBC AUTO-ENTMCNC: 32 G/DL (ref 32–36)
MCV RBC AUTO: 89 FL (ref 80–99)
MONOCYTES # BLD AUTO: 0.4 10^3/UL (ref 0–1)
MONOCYTES NFR BLD AUTO: 13 % (ref 0–12)
NEUTROPHILS # BLD AUTO: 2.1 10^3/UL (ref 1.8–7.8)
NEUTROPHILS NFR BLD AUTO: 64 % (ref 42–75)
PHOSPHATE SERPL-MCNC: 2.2 MG/DL (ref 2.3–4.7)
PLATELET # BLD: 42 10^3/UL (ref 130–400)
PMV BLD AUTO: (no result) FL (ref 9–12.2)
POTASSIUM SERPL-SCNC: 3.7 MMOL/L (ref 3.6–5)
PROT SERPL-MCNC: 5.6 GM/DL (ref 6.4–8.2)
SODIUM SERPL-SCNC: 135 MMOL/L (ref 135–145)
WBC # BLD AUTO: 3.2 10^3/UL (ref 4.3–11)

## 2023-05-03 RX ADMIN — IPRATROPIUM BROMIDE AND ALBUTEROL SULFATE SCH ML: .5; 3 SOLUTION RESPIRATORY (INHALATION) at 15:15

## 2023-05-03 RX ADMIN — GUAIFENESIN AND CODEINE PHOSPHATE PRN ML: 100; 10 SOLUTION ORAL at 03:59

## 2023-05-03 RX ADMIN — POTASSIUM CHLORIDE SCH MEQ: 1500 TABLET, EXTENDED RELEASE ORAL at 06:14

## 2023-05-03 RX ADMIN — IPRATROPIUM BROMIDE AND ALBUTEROL SULFATE SCH ML: .5; 3 SOLUTION RESPIRATORY (INHALATION) at 07:28

## 2023-05-03 RX ADMIN — POTASSIUM CHLORIDE SCH MLS/HR: 200 INJECTION, SOLUTION INTRAVENOUS at 06:02

## 2023-05-03 RX ADMIN — ASCORBIC ACID, VITAMIN A PALMITATE, CHOLECALCIFEROL, THIAMINE HYDROCHLORIDE, RIBOFLAVIN-5 PHOSPHATE SODIUM, PYRIDOXINE HYDROCHLORIDE, NIACINAMIDE, DEXPANTHENOL, ALPHA-TOCOPHEROL ACETATE, VITAMIN K1, FOLIC ACID, BIOTIN, CYANOCOBALAMIN SCH MLS/HR: 200; 3300; 200; 6; 3.6; 6; 40; 15; 10; 150; 600; 60; 5 INJECTION, SOLUTION INTRAVENOUS at 15:00

## 2023-05-03 RX ADMIN — Medication SCH MG: at 07:53

## 2023-05-03 RX ADMIN — Medication SCH MG: at 20:39

## 2023-05-03 RX ADMIN — Medication SCH MG: at 06:14

## 2023-05-03 RX ADMIN — BENZONATATE SCH MG: 100 CAPSULE ORAL at 20:39

## 2023-05-03 RX ADMIN — POTASSIUM CHLORIDE SCH MLS/HR: 200 INJECTION, SOLUTION INTRAVENOUS at 06:14

## 2023-05-03 RX ADMIN — BENZONATATE SCH MG: 100 CAPSULE ORAL at 07:53

## 2023-05-03 RX ADMIN — GUAIFENESIN AND CODEINE PHOSPHATE PRN ML: 100; 10 SOLUTION ORAL at 16:44

## 2023-05-03 RX ADMIN — IPRATROPIUM BROMIDE AND ALBUTEROL SULFATE SCH ML: .5; 3 SOLUTION RESPIRATORY (INHALATION) at 21:00

## 2023-05-03 RX ADMIN — SENNOSIDES SCH MG: 8.6 TABLET, FILM COATED ORAL at 20:22

## 2023-05-03 RX ADMIN — ANTACID TABLETS PRN MG: 500 TABLET, CHEWABLE ORAL at 17:51

## 2023-05-03 RX ADMIN — SODIUM CHLORIDE SCH MLS/HR: 900 INJECTION, SOLUTION INTRAVENOUS at 05:07

## 2023-05-03 RX ADMIN — GUAIFENESIN AND CODEINE PHOSPHATE PRN ML: 100; 10 SOLUTION ORAL at 07:53

## 2023-05-03 RX ADMIN — Medication SCH EA: at 06:14

## 2023-05-03 RX ADMIN — SODIUM CHLORIDE SCH MLS/HR: 900 INJECTION INTRAVENOUS at 14:16

## 2023-05-03 RX ADMIN — SENNOSIDES SCH MG: 8.6 TABLET, FILM COATED ORAL at 09:00

## 2023-05-03 RX ADMIN — POTASSIUM CHLORIDE SCH MEQ: 1500 TABLET, EXTENDED RELEASE ORAL at 06:03

## 2023-05-03 RX ADMIN — AZITHROMYCIN SCH MG: 250 TABLET, FILM COATED ORAL at 17:51

## 2023-05-03 RX ADMIN — POTASSIUM CHLORIDE SCH MLS/HR: 200 INJECTION, SOLUTION INTRAVENOUS at 07:10

## 2023-05-03 RX ADMIN — ONDANSETRON PRN MG: 2 INJECTION, SOLUTION INTRAMUSCULAR; INTRAVENOUS at 19:43

## 2023-05-03 RX ADMIN — SODIUM CHLORIDE SCH MLS/HR: 900 INJECTION, SOLUTION INTRAVENOUS at 09:34

## 2023-05-03 RX ADMIN — BENZONATATE SCH MG: 100 CAPSULE ORAL at 14:16

## 2023-05-03 RX ADMIN — SODIUM CHLORIDE SCH MLS/HR: 900 INJECTION INTRAVENOUS at 03:59

## 2023-05-03 RX ADMIN — DOCUSATE SODIUM SCH MG: 100 CAPSULE ORAL at 20:22

## 2023-05-03 RX ADMIN — SODIUM CHLORIDE SCH MLS/HR: 900 INJECTION INTRAVENOUS at 19:43

## 2023-05-03 RX ADMIN — MAGNESIUM SULFATE IN DEXTROSE SCH MLS/HR: 10 INJECTION, SOLUTION INTRAVENOUS at 06:02

## 2023-05-03 RX ADMIN — FOLIC ACID SCH MG: 1 TABLET ORAL at 07:53

## 2023-05-03 RX ADMIN — DOCUSATE SODIUM SCH MG: 100 CAPSULE ORAL at 09:00

## 2023-05-04 VITALS — DIASTOLIC BLOOD PRESSURE: 72 MMHG | SYSTOLIC BLOOD PRESSURE: 118 MMHG

## 2023-05-04 LAB
ALBUMIN SERPL-MCNC: 2.1 GM/DL (ref 3.2–4.5)
ALP SERPL-CCNC: 95 U/L (ref 40–136)
ALT SERPL-CCNC: 25 U/L (ref 0–55)
BASOPHILS # BLD AUTO: 0 10^3/UL (ref 0–0.1)
BASOPHILS NFR BLD AUTO: 0 % (ref 0–10)
BILIRUB SERPL-MCNC: 1.2 MG/DL (ref 0.1–1)
BUN/CREAT SERPL: 9
CALCIUM SERPL-MCNC: 7.5 MG/DL (ref 8.5–10.1)
CHLORIDE SERPL-SCNC: 106 MMOL/L (ref 98–107)
CO2 SERPL-SCNC: 19 MMOL/L (ref 21–32)
CREAT SERPL-MCNC: 0.57 MG/DL (ref 0.6–1.3)
EOSINOPHIL # BLD AUTO: 0.1 10^3/UL (ref 0–0.3)
EOSINOPHIL NFR BLD AUTO: 2 % (ref 0–10)
GFR SERPLBLD BASED ON 1.73 SQ M-ARVRAT: 107 ML/MIN
GLUCOSE SERPL-MCNC: 112 MG/DL (ref 70–105)
HCT VFR BLD CALC: 36 % (ref 35–52)
HGB BLD-MCNC: 11.6 G/DL (ref 11.5–16)
LYMPHOCYTES # BLD AUTO: 0.5 10^3/UL (ref 1–4)
LYMPHOCYTES NFR BLD AUTO: 24 % (ref 12–44)
MAGNESIUM SERPL-MCNC: 1.5 MG/DL (ref 1.6–2.4)
MANUAL DIFFERENTIAL PERFORMED BLD QL: NO
MCH RBC QN AUTO: 29 PG (ref 25–34)
MCHC RBC AUTO-ENTMCNC: 33 G/DL (ref 32–36)
MCV RBC AUTO: 89 FL (ref 80–99)
MONOCYTES # BLD AUTO: 0.3 10^3/UL (ref 0–1)
MONOCYTES NFR BLD AUTO: 12 % (ref 0–12)
NEUTROPHILS # BLD AUTO: 1.4 10^3/UL (ref 1.8–7.8)
NEUTROPHILS NFR BLD AUTO: 62 % (ref 42–75)
PHOSPHATE SERPL-MCNC: 2.1 MG/DL (ref 2.3–4.7)
PLATELET # BLD: 35 10^3/UL (ref 130–400)
PMV BLD AUTO: (no result) FL (ref 9–12.2)
POTASSIUM SERPL-SCNC: 3.7 MMOL/L (ref 3.6–5)
PROT SERPL-MCNC: 5.4 GM/DL (ref 6.4–8.2)
SODIUM SERPL-SCNC: 132 MMOL/L (ref 135–145)
WBC # BLD AUTO: 2.2 10^3/UL (ref 4.3–11)

## 2023-05-04 PROCEDURE — 5A0935A ASSISTANCE WITH RESPIRATORY VENTILATION, LESS THAN 24 CONSECUTIVE HOURS, HIGH NASAL FLOW/VELOCITY: ICD-10-PCS | Performed by: INTERNAL MEDICINE

## 2023-05-04 RX ADMIN — SODIUM CHLORIDE SCH MLS/HR: 900 INJECTION INTRAVENOUS at 03:18

## 2023-05-04 RX ADMIN — GUAIFENESIN AND CODEINE PHOSPHATE PRN ML: 100; 10 SOLUTION ORAL at 07:51

## 2023-05-04 RX ADMIN — SODIUM CHLORIDE SCH MLS/HR: 900 INJECTION, SOLUTION INTRAVENOUS at 00:02

## 2023-05-04 RX ADMIN — DOCUSATE SODIUM SCH MG: 100 CAPSULE ORAL at 08:09

## 2023-05-04 RX ADMIN — SENNOSIDES SCH MG: 8.6 TABLET, FILM COATED ORAL at 08:09

## 2023-05-04 RX ADMIN — POTASSIUM CHLORIDE SCH MLS/HR: 200 INJECTION, SOLUTION INTRAVENOUS at 05:47

## 2023-05-04 RX ADMIN — IPRATROPIUM BROMIDE AND ALBUTEROL SULFATE SCH ML: .5; 3 SOLUTION RESPIRATORY (INHALATION) at 14:33

## 2023-05-04 RX ADMIN — ANTACID TABLETS PRN MG: 500 TABLET, CHEWABLE ORAL at 07:51

## 2023-05-04 RX ADMIN — LORAZEPAM PRN MG: 1 TABLET ORAL at 11:40

## 2023-05-04 RX ADMIN — BENZONATATE SCH MG: 100 CAPSULE ORAL at 20:57

## 2023-05-04 RX ADMIN — ONDANSETRON PRN MG: 2 INJECTION, SOLUTION INTRAMUSCULAR; INTRAVENOUS at 11:31

## 2023-05-04 RX ADMIN — SODIUM CHLORIDE SCH MLS/HR: 900 INJECTION INTRAVENOUS at 11:41

## 2023-05-04 RX ADMIN — IPRATROPIUM BROMIDE AND ALBUTEROL SULFATE SCH ML: .5; 3 SOLUTION RESPIRATORY (INHALATION) at 20:46

## 2023-05-04 RX ADMIN — IPRATROPIUM BROMIDE AND ALBUTEROL SULFATE SCH ML: .5; 3 SOLUTION RESPIRATORY (INHALATION) at 07:11

## 2023-05-04 RX ADMIN — MAGNESIUM SULFATE IN DEXTROSE SCH MLS/HR: 10 INJECTION, SOLUTION INTRAVENOUS at 04:34

## 2023-05-04 RX ADMIN — IPRATROPIUM BROMIDE AND ALBUTEROL SULFATE SCH ML: .5; 3 SOLUTION RESPIRATORY (INHALATION) at 02:32

## 2023-05-04 RX ADMIN — BENZONATATE SCH MG: 100 CAPSULE ORAL at 08:09

## 2023-05-04 RX ADMIN — BENZONATATE SCH MG: 100 CAPSULE ORAL at 13:31

## 2023-05-04 RX ADMIN — FOLIC ACID SCH MG: 1 TABLET ORAL at 08:09

## 2023-05-04 RX ADMIN — DOCUSATE SODIUM SCH MG: 100 CAPSULE ORAL at 20:55

## 2023-05-04 RX ADMIN — AZITHROMYCIN SCH MG: 250 TABLET, FILM COATED ORAL at 16:44

## 2023-05-04 RX ADMIN — SENNOSIDES SCH MG: 8.6 TABLET, FILM COATED ORAL at 20:55

## 2023-05-04 RX ADMIN — PANTOPRAZOLE SODIUM SCH MG: 40 TABLET, DELAYED RELEASE ORAL at 11:31

## 2023-05-04 RX ADMIN — Medication SCH MG: at 07:24

## 2023-05-04 RX ADMIN — POTASSIUM CHLORIDE SCH MEQ: 1500 TABLET, EXTENDED RELEASE ORAL at 07:24

## 2023-05-04 RX ADMIN — SODIUM CHLORIDE SCH MLS/HR: 900 INJECTION, SOLUTION INTRAVENOUS at 09:10

## 2023-05-04 RX ADMIN — LORAZEPAM PRN MG: 1 TABLET ORAL at 19:50

## 2023-05-04 RX ADMIN — GUAIFENESIN AND CODEINE PHOSPHATE PRN ML: 100; 10 SOLUTION ORAL at 03:18

## 2023-05-04 RX ADMIN — MAGNESIUM SULFATE IN DEXTROSE SCH MLS/HR: 10 INJECTION, SOLUTION INTRAVENOUS at 04:33

## 2023-05-04 RX ADMIN — SODIUM CHLORIDE SCH MLS/HR: 900 INJECTION INTRAVENOUS at 19:50

## 2023-05-04 RX ADMIN — GUAIFENESIN AND CODEINE PHOSPHATE PRN ML: 100; 10 SOLUTION ORAL at 13:31

## 2023-05-04 RX ADMIN — Medication SCH MG: at 08:09

## 2023-05-04 RX ADMIN — MAGNESIUM SULFATE IN DEXTROSE SCH MLS/HR: 10 INJECTION, SOLUTION INTRAVENOUS at 05:47

## 2023-05-04 RX ADMIN — ANTACID TABLETS PRN MG: 500 TABLET, CHEWABLE ORAL at 16:40

## 2023-05-04 RX ADMIN — ASCORBIC ACID, VITAMIN A PALMITATE, CHOLECALCIFEROL, THIAMINE HYDROCHLORIDE, RIBOFLAVIN-5 PHOSPHATE SODIUM, PYRIDOXINE HYDROCHLORIDE, NIACINAMIDE, DEXPANTHENOL, ALPHA-TOCOPHEROL ACETATE, VITAMIN K1, FOLIC ACID, BIOTIN, CYANOCOBALAMIN SCH MLS/HR: 200; 3300; 200; 6; 3.6; 6; 40; 15; 10; 150; 600; 60; 5 INJECTION, SOLUTION INTRAVENOUS at 07:08

## 2023-05-04 RX ADMIN — Medication SCH EA: at 07:24

## 2023-05-04 RX ADMIN — POTASSIUM CHLORIDE SCH MEQ: 1500 TABLET, EXTENDED RELEASE ORAL at 05:47

## 2023-05-04 RX ADMIN — MAGNESIUM SULFATE IN DEXTROSE SCH MLS/HR: 10 INJECTION, SOLUTION INTRAVENOUS at 04:32

## 2023-05-05 VITALS — DIASTOLIC BLOOD PRESSURE: 77 MMHG | SYSTOLIC BLOOD PRESSURE: 120 MMHG

## 2023-05-05 VITALS — SYSTOLIC BLOOD PRESSURE: 125 MMHG | DIASTOLIC BLOOD PRESSURE: 74 MMHG

## 2023-05-05 VITALS — SYSTOLIC BLOOD PRESSURE: 107 MMHG | DIASTOLIC BLOOD PRESSURE: 71 MMHG

## 2023-05-05 VITALS — DIASTOLIC BLOOD PRESSURE: 67 MMHG | SYSTOLIC BLOOD PRESSURE: 133 MMHG

## 2023-05-05 VITALS — DIASTOLIC BLOOD PRESSURE: 71 MMHG | SYSTOLIC BLOOD PRESSURE: 107 MMHG

## 2023-05-05 LAB
ALBUMIN SERPL-MCNC: 2.1 GM/DL (ref 3.2–4.5)
ALP SERPL-CCNC: 90 U/L (ref 40–136)
ALT SERPL-CCNC: 22 U/L (ref 0–55)
BASOPHILS # BLD AUTO: 0 10^3/UL (ref 0–0.1)
BASOPHILS NFR BLD AUTO: 1 % (ref 0–10)
BILIRUB SERPL-MCNC: 0.9 MG/DL (ref 0.1–1)
BUN/CREAT SERPL: 9
CALCIUM SERPL-MCNC: 7.4 MG/DL (ref 8.5–10.1)
CHLORIDE SERPL-SCNC: 106 MMOL/L (ref 98–107)
CO2 SERPL-SCNC: 18 MMOL/L (ref 21–32)
CREAT SERPL-MCNC: 0.57 MG/DL (ref 0.6–1.3)
EOSINOPHIL # BLD AUTO: 0.1 10^3/UL (ref 0–0.3)
EOSINOPHIL NFR BLD AUTO: 2 % (ref 0–10)
GFR SERPLBLD BASED ON 1.73 SQ M-ARVRAT: 107 ML/MIN
GLUCOSE SERPL-MCNC: 91 MG/DL (ref 70–105)
HCT VFR BLD CALC: 36 % (ref 35–52)
HGB BLD-MCNC: 11.8 G/DL (ref 11.5–16)
LYMPHOCYTES # BLD AUTO: 0.4 10^3/UL (ref 1–4)
LYMPHOCYTES NFR BLD AUTO: 15 % (ref 12–44)
MAGNESIUM SERPL-MCNC: 1.5 MG/DL (ref 1.6–2.4)
MANUAL DIFFERENTIAL PERFORMED BLD QL: NO
MCH RBC QN AUTO: 29 PG (ref 25–34)
MCHC RBC AUTO-ENTMCNC: 33 G/DL (ref 32–36)
MCV RBC AUTO: 90 FL (ref 80–99)
MONOCYTES # BLD AUTO: 0.3 10^3/UL (ref 0–1)
MONOCYTES NFR BLD AUTO: 10 % (ref 0–12)
NEUTROPHILS # BLD AUTO: 2 10^3/UL (ref 1.8–7.8)
NEUTROPHILS NFR BLD AUTO: 72 % (ref 42–75)
PLATELET # BLD: 30 10^3/UL (ref 130–400)
PMV BLD AUTO: (no result) FL (ref 9–12.2)
POTASSIUM SERPL-SCNC: 4 MMOL/L (ref 3.6–5)
PROT SERPL-MCNC: 5.3 GM/DL (ref 6.4–8.2)
SODIUM SERPL-SCNC: 132 MMOL/L (ref 135–145)
WBC # BLD AUTO: 2.8 10^3/UL (ref 4.3–11)

## 2023-05-05 RX ADMIN — BENZONATATE SCH MG: 100 CAPSULE ORAL at 12:04

## 2023-05-05 RX ADMIN — BENZONATATE SCH MG: 100 CAPSULE ORAL at 09:33

## 2023-05-05 RX ADMIN — SENNOSIDES SCH MG: 8.6 TABLET, FILM COATED ORAL at 09:33

## 2023-05-05 RX ADMIN — Medication SCH EA: at 06:35

## 2023-05-05 RX ADMIN — POTASSIUM CHLORIDE SCH MEQ: 1500 TABLET, EXTENDED RELEASE ORAL at 06:35

## 2023-05-05 RX ADMIN — DOCUSATE SODIUM SCH MG: 100 CAPSULE ORAL at 09:33

## 2023-05-05 RX ADMIN — SODIUM CHLORIDE SCH MLS/HR: 900 INJECTION INTRAVENOUS at 12:04

## 2023-05-05 RX ADMIN — IPRATROPIUM BROMIDE AND ALBUTEROL SULFATE SCH ML: .5; 3 SOLUTION RESPIRATORY (INHALATION) at 02:38

## 2023-05-05 RX ADMIN — SODIUM CHLORIDE SCH MLS/HR: 900 INJECTION INTRAVENOUS at 04:34

## 2023-05-05 RX ADMIN — FOLIC ACID SCH MG: 1 TABLET ORAL at 09:33

## 2023-05-05 RX ADMIN — PANTOPRAZOLE SODIUM SCH MG: 40 TABLET, DELAYED RELEASE ORAL at 09:33

## 2023-05-05 RX ADMIN — IPRATROPIUM BROMIDE AND ALBUTEROL SULFATE SCH ML: .5; 3 SOLUTION RESPIRATORY (INHALATION) at 07:38

## 2023-11-01 ENCOUNTER — HOSPITAL ENCOUNTER (OUTPATIENT)
Dept: HOSPITAL 75 - PREOP | Age: 58
Discharge: HOME | End: 2023-11-01
Attending: SURGERY
Payer: MEDICARE

## 2023-11-01 VITALS — WEIGHT: 107.14 LBS | BODY MASS INDEX: 17.22 KG/M2 | HEIGHT: 65.98 IN

## 2023-11-01 DIAGNOSIS — Z01.818: Primary | ICD-10-CM

## 2023-11-13 ENCOUNTER — HOSPITAL ENCOUNTER (OUTPATIENT)
Dept: HOSPITAL 75 - ENDO | Age: 58
Discharge: HOME | End: 2023-11-13
Attending: SURGERY
Payer: MEDICARE

## 2023-11-13 VITALS — HEIGHT: 65.75 IN | BODY MASS INDEX: 17.43 KG/M2 | WEIGHT: 107.14 LBS

## 2023-11-13 VITALS — DIASTOLIC BLOOD PRESSURE: 80 MMHG | SYSTOLIC BLOOD PRESSURE: 118 MMHG

## 2023-11-13 VITALS — SYSTOLIC BLOOD PRESSURE: 104 MMHG | DIASTOLIC BLOOD PRESSURE: 63 MMHG

## 2023-11-13 VITALS — DIASTOLIC BLOOD PRESSURE: 79 MMHG | SYSTOLIC BLOOD PRESSURE: 116 MMHG

## 2023-11-13 VITALS — SYSTOLIC BLOOD PRESSURE: 105 MMHG | DIASTOLIC BLOOD PRESSURE: 69 MMHG

## 2023-11-13 VITALS — SYSTOLIC BLOOD PRESSURE: 114 MMHG | DIASTOLIC BLOOD PRESSURE: 67 MMHG

## 2023-11-13 DIAGNOSIS — K20.90: ICD-10-CM

## 2023-11-13 DIAGNOSIS — K29.70: Primary | ICD-10-CM

## 2023-11-13 DIAGNOSIS — I85.00: ICD-10-CM

## 2023-11-13 DIAGNOSIS — K70.30: ICD-10-CM

## 2023-11-13 DIAGNOSIS — K31.89: ICD-10-CM

## 2023-11-13 PROCEDURE — 88305 TISSUE EXAM BY PATHOLOGIST: CPT
